# Patient Record
Sex: MALE | Race: WHITE | Employment: OTHER | URBAN - METROPOLITAN AREA
[De-identification: names, ages, dates, MRNs, and addresses within clinical notes are randomized per-mention and may not be internally consistent; named-entity substitution may affect disease eponyms.]

---

## 2018-12-12 ENCOUNTER — HOSPITAL ENCOUNTER (OUTPATIENT)
Dept: MRI IMAGING | Age: 43
Discharge: HOME | End: 2018-12-12
Payer: COMMERCIAL

## 2018-12-12 DIAGNOSIS — G44.89 ALLERGIC HEADACHE: ICD-10-CM

## 2018-12-12 DIAGNOSIS — I67.1 CEREBRAL ANEURYSM, NONRUPTURED: ICD-10-CM

## 2018-12-12 LAB
GFR AFRICAN AMERICAN: >60 ML/MIN/1.73M2
GFR NON-AFRICAN AMERICAN: >60 ML/MIN/1.73M2
POC CREATININE: 0.8 MG/DL (ref 0.9–1.3)

## 2018-12-21 ENCOUNTER — TELEPHONE (OUTPATIENT)
Dept: GASTROENTEROLOGY | Age: 43
End: 2018-12-21

## 2018-12-21 NOTE — TELEPHONE ENCOUNTER
[facility] pre cert dept called to get clinical information for the MRI pre-cert. They said that the MRI was ordered by Awa Hall I advised them that it incorrect. Romy Granger has never seen this patient. She expressed understanding.

## 2019-03-06 ENCOUNTER — HOSPITAL ENCOUNTER (OUTPATIENT)
Dept: MRI IMAGING | Age: 44
Discharge: HOME | End: 2019-03-06
Payer: COMMERCIAL

## 2019-03-06 PROCEDURE — 70544 MR ANGIOGRAPHY HEAD W/O DYE: CPT

## 2019-09-10 ENCOUNTER — HOSPITAL ENCOUNTER (OUTPATIENT)
Age: 44
Discharge: HOME | End: 2019-09-10
Payer: COMMERCIAL

## 2019-09-10 ENCOUNTER — HOSPITAL ENCOUNTER (OUTPATIENT)
Dept: GENERAL RADIOLOGY | Age: 44
Discharge: HOME | End: 2019-09-10
Payer: COMMERCIAL

## 2019-09-10 DIAGNOSIS — M54.2 NECK PAIN: ICD-10-CM

## 2019-09-10 DIAGNOSIS — M54.9 UPPER BACK PAIN: ICD-10-CM

## 2019-09-10 PROCEDURE — 72050 X-RAY EXAM NECK SPINE 4/5VWS: CPT

## 2020-05-11 ENCOUNTER — HOSPITAL ENCOUNTER (OUTPATIENT)
Age: 45
Discharge: HOME | End: 2020-05-11
Payer: COMMERCIAL

## 2020-05-11 LAB — HEMOGLOBIN: 15.2 GM/DL (ref 13.5–18)

## 2020-05-11 PROCEDURE — 85018 HEMOGLOBIN: CPT

## 2020-05-11 PROCEDURE — 84403 ASSAY OF TOTAL TESTOSTERONE: CPT

## 2020-05-11 PROCEDURE — 36415 COLL VENOUS BLD VENIPUNCTURE: CPT

## 2020-05-14 LAB — TESTOSTERONE TOTAL-MALE: 138 NG/DL (ref 300–890)

## 2020-07-30 ENCOUNTER — HOSPITAL ENCOUNTER (OUTPATIENT)
Age: 45
Discharge: HOME | End: 2020-07-30
Payer: COMMERCIAL

## 2020-07-30 LAB — HEMOGLOBIN: 16.1 GM/DL (ref 13.5–18)

## 2020-07-30 PROCEDURE — 85018 HEMOGLOBIN: CPT

## 2020-07-30 PROCEDURE — 84403 ASSAY OF TOTAL TESTOSTERONE: CPT

## 2020-07-30 PROCEDURE — 36415 COLL VENOUS BLD VENIPUNCTURE: CPT

## 2020-08-01 LAB — TESTOSTERONE TOTAL-MALE: 235 NG/DL (ref 300–890)

## 2020-11-24 ENCOUNTER — HOSPITAL ENCOUNTER (OUTPATIENT)
Age: 45
Discharge: HOME | End: 2020-11-24
Payer: COMMERCIAL

## 2020-11-24 LAB
CHOLESTEROL, FASTING: 281 MG/DL
ESTIMATED AVERAGE GLUCOSE: 197 MG/DL
HBA1C MFR BLD: 8.5 % (ref 4.2–6.3)
HDLC SERPL-MCNC: 40 MG/DL
LDL CHOLESTEROL DIRECT: 209 MG/DL
TRIGLYCERIDE, FASTING: 336 MG/DL

## 2020-11-24 PROCEDURE — 80061 LIPID PANEL: CPT

## 2020-11-24 PROCEDURE — 36415 COLL VENOUS BLD VENIPUNCTURE: CPT

## 2020-11-24 PROCEDURE — 83036 HEMOGLOBIN GLYCOSYLATED A1C: CPT

## 2020-11-24 PROCEDURE — 84403 ASSAY OF TOTAL TESTOSTERONE: CPT

## 2020-11-27 LAB — TESTOSTERONE TOTAL-MALE: 311 NG/DL (ref 300–890)

## 2021-05-10 ENCOUNTER — HOSPITAL ENCOUNTER (OUTPATIENT)
Age: 46
Discharge: HOME | End: 2021-05-10
Payer: COMMERCIAL

## 2021-05-10 LAB
CHOLESTEROL: 247 MG/DL
ESTIMATED AVERAGE GLUCOSE: 197 MG/DL
HBA1C MFR BLD: 8.5 % (ref 4.2–6.3)
HDLC SERPL-MCNC: 39 MG/DL
LDL CHOLESTEROL DIRECT: 183 MG/DL
TRIGL SERPL-MCNC: 209 MG/DL

## 2021-05-10 PROCEDURE — 84403 ASSAY OF TOTAL TESTOSTERONE: CPT

## 2021-05-10 PROCEDURE — 36415 COLL VENOUS BLD VENIPUNCTURE: CPT

## 2021-05-10 PROCEDURE — 80061 LIPID PANEL: CPT

## 2021-05-10 PROCEDURE — 83036 HEMOGLOBIN GLYCOSYLATED A1C: CPT

## 2021-05-10 PROCEDURE — 83721 ASSAY OF BLOOD LIPOPROTEIN: CPT

## 2021-05-11 LAB — TESTOSTERONE TOTAL-MALE: 153 NG/DL (ref 300–890)

## 2021-11-15 ENCOUNTER — HOSPITAL ENCOUNTER (OUTPATIENT)
Age: 46
Discharge: HOME | End: 2021-11-15
Payer: COMMERCIAL

## 2021-11-15 LAB
ALBUMIN SERPL-MCNC: 4.8 GM/DL (ref 3.4–5)
ALP BLD-CCNC: 75 IU/L (ref 40–128)
ALT SERPL-CCNC: 31 U/L (ref 10–40)
ANION GAP SERPL CALCULATED.3IONS-SCNC: 12 MMOL/L (ref 4–16)
AST SERPL-CCNC: 20 IU/L (ref 15–37)
BASOPHILS ABSOLUTE: 0 K/CU MM
BASOPHILS RELATIVE PERCENT: 0.3 % (ref 0–1)
BILIRUB SERPL-MCNC: 0.3 MG/DL (ref 0–1)
BUN BLDV-MCNC: 13 MG/DL (ref 6–23)
CALCIUM SERPL-MCNC: 9.2 MG/DL (ref 8.3–10.6)
CHLORIDE BLD-SCNC: 100 MMOL/L (ref 99–110)
CO2: 24 MMOL/L (ref 21–32)
CREAT SERPL-MCNC: 0.6 MG/DL (ref 0.9–1.3)
DIFFERENTIAL TYPE: ABNORMAL
EOSINOPHILS ABSOLUTE: 0.1 K/CU MM
EOSINOPHILS RELATIVE PERCENT: 1 % (ref 0–3)
ESTIMATED AVERAGE GLUCOSE: 160 MG/DL
GFR AFRICAN AMERICAN: >60 ML/MIN/1.73M2
GFR NON-AFRICAN AMERICAN: >60 ML/MIN/1.73M2
GLUCOSE BLD-MCNC: 150 MG/DL (ref 70–99)
HBA1C MFR BLD: 7.2 % (ref 4.2–6.3)
HCT VFR BLD CALC: 45.8 % (ref 42–52)
HEMOGLOBIN: 15.1 GM/DL (ref 13.5–18)
IMMATURE NEUTROPHIL %: 0.6 % (ref 0–0.43)
LYMPHOCYTES ABSOLUTE: 2 K/CU MM
LYMPHOCYTES RELATIVE PERCENT: 29.2 % (ref 24–44)
MCH RBC QN AUTO: 31 PG (ref 27–31)
MCHC RBC AUTO-ENTMCNC: 33 % (ref 32–36)
MCV RBC AUTO: 94 FL (ref 78–100)
MONOCYTES ABSOLUTE: 0.7 K/CU MM
MONOCYTES RELATIVE PERCENT: 10 % (ref 0–4)
NUCLEATED RBC %: 0 %
PDW BLD-RTO: 13.2 % (ref 11.7–14.9)
PLATELET # BLD: 268 K/CU MM (ref 140–440)
PMV BLD AUTO: 9.7 FL (ref 7.5–11.1)
POTASSIUM SERPL-SCNC: 4.4 MMOL/L (ref 3.5–5.1)
RBC # BLD: 4.87 M/CU MM (ref 4.6–6.2)
SEGMENTED NEUTROPHILS ABSOLUTE COUNT: 4 K/CU MM
SEGMENTED NEUTROPHILS RELATIVE PERCENT: 58.9 % (ref 36–66)
SODIUM BLD-SCNC: 136 MMOL/L (ref 135–145)
TOTAL IMMATURE NEUTOROPHIL: 0.04 K/CU MM
TOTAL NUCLEATED RBC: 0 K/CU MM
TOTAL PROTEIN: 7.9 GM/DL (ref 6.4–8.2)
WBC # BLD: 6.8 K/CU MM (ref 4–10.5)

## 2021-11-15 PROCEDURE — 84403 ASSAY OF TOTAL TESTOSTERONE: CPT

## 2021-11-15 PROCEDURE — 80053 COMPREHEN METABOLIC PANEL: CPT

## 2021-11-15 PROCEDURE — 85025 COMPLETE CBC W/AUTO DIFF WBC: CPT

## 2021-11-15 PROCEDURE — 83036 HEMOGLOBIN GLYCOSYLATED A1C: CPT

## 2021-11-15 PROCEDURE — 36415 COLL VENOUS BLD VENIPUNCTURE: CPT

## 2021-11-17 LAB — TESTOSTERONE TOTAL-MALE: 363 NG/DL (ref 300–890)

## 2022-03-18 ENCOUNTER — HOSPITAL ENCOUNTER (EMERGENCY)
Age: 47
Discharge: HOME | End: 2022-03-18
Attending: EMERGENCY MEDICINE
Payer: COMMERCIAL

## 2022-03-18 VITALS
HEIGHT: 73 IN | OXYGEN SATURATION: 96 % | HEART RATE: 104 BPM | BODY MASS INDEX: 37.37 KG/M2 | RESPIRATION RATE: 17 BRPM | SYSTOLIC BLOOD PRESSURE: 128 MMHG | WEIGHT: 282 LBS | TEMPERATURE: 97.7 F | DIASTOLIC BLOOD PRESSURE: 90 MMHG

## 2022-03-18 DIAGNOSIS — S86.911A STRAIN OF RIGHT KNEE, INITIAL ENCOUNTER: Primary | ICD-10-CM

## 2022-03-18 PROCEDURE — 99283 EMERGENCY DEPT VISIT LOW MDM: CPT

## 2022-03-18 RX ORDER — TESTOSTERONE 20.25 MG/1.25G
GEL TOPICAL
COMMUNITY

## 2022-03-18 NOTE — ED NOTES
Discharge instructions given to the patient who expressed understanding of information and follow up care.       Nettie Stevens RN  03/18/22 0178

## 2022-03-18 NOTE — ED PROVIDER NOTES
EMERGENCY DEPARTMENT ENCOUNTER      PCP: Harry Martínez MD    [facility]    Chief Complaint   Patient presents with    Knee Pain       HPI    Rosaura Allison is a 51 y.o. male who presents with Right knee pain. Onset was last PM. The context is pt slipped and fell in the garage tisting the knee. Pain is localized to to the posterior right side of the knee. The patient has no associated other injuries. The pain is aggravated by ambulation and knee movement, in particular any twisting movement. No swelling. No numbness, tingling or weakness. REVIEW OF SYSTEMS    General: Denies fever or chills  Cardiac: Denies chest pain  Pulmonary: Denies shortness of breath  GI: Denies abdominal pain, vomiting, or diarrhea  : No dysuria or hematuria    Denies any other muscles skeletal injuries, including chest wall and back. All other review of systems are negative  See HPI and nursing notes for additional information     PAST MEDICAL & SURGICAL HISTORY    Past Medical History:   Diagnosis Date    Ankylosing spondylitis ([facility] [address]     Gout     High blood pressure     High cholesterol     Migraine     Thyroid disease     Ulcer      No past surgical history on file. CURRENT MEDICATIONS    Current Outpatient Rx   Medication Sig Dispense Refill    SITagliptin-metFORMIN HCl (JANUMET PO) Take by mouth      Testosterone 1.62 % GEL Place onto the skin      ibuprofen (ADVIL;MOTRIN) 800 MG tablet Take 800 mg by mouth every 6 hours as needed.  naproxen (NAPROSYN) 500 MG tablet Take 1 tablet by mouth 2 times daily (with meals). 60 tablet 1    naproxen (NAPROSYN) 500 MG tablet Take 1 tablet by mouth 2 times daily (with meals).  60 tablet 3       ALLERGIES    No Known Allergies    SOCIAL & FAMILY HISTORY    Social History     Socioeconomic History    Marital status:      Spouse name: Not on file    Number of children: Not on file    Years of education: Not on file    Highest education level: Not on file   Occupational History    Not on file   Tobacco Use    Smoking status: Never Smoker    Smokeless tobacco: Never Used   Vaping Use    Vaping Use: Never used   Substance and Sexual Activity    Alcohol use: No    Drug use: No    Sexual activity: Never   Other Topics Concern    Not on file   Social History Narrative    Not on file     Social Determinants of Health     Financial Resource Strain:     Difficulty of Paying Living Expenses: Not on file   Food Insecurity:     Worried About Running Out of Food in the Last Year: Not on file    Halley of Food in the Last Year: Not on file   Transportation Needs:     Lack of Transportation (Medical): Not on file    Lack of Transportation (Non-Medical):  Not on file   Physical Activity:     Days of Exercise per Week: Not on file    Minutes of Exercise per Session: Not on file   Stress:     Feeling of Stress : Not on file   Social Connections:     Frequency of Communication with Friends and Family: Not on file    Frequency of Social Gatherings with Friends and Family: Not on file    Attends Catholic Services: Not on file    Active Member of [address] [place] [address] or Organizations: Not on file    Attends Club or Organization Meetings: Not on file    Marital Status: Not on file   Intimate Partner Violence:     Fear of Current or Ex-Partner: Not on file    Emotionally Abused: Not on file    Physically Abused: Not on file    Sexually Abused: Not on file   Housing Stability:     Unable to Pay for Housing in the Last Year: Not on file    Number of [place] in the Last Year: Not on file    Unstable Housing in the Last Year: Not on file     Family History   Problem Relation Age of Onset    Diabetes Father            PHYSICAL EXAM    VITAL SIGNS: BP (!) 128/90   Pulse 104   Temp 97.7 °F (36.5 °C) (Oral)   Resp 17   Ht 6' 1\" (1.854 m)   Wt 282 lb (127.9 kg)   SpO2 96%   BMI 37.21 kg/m²   Constitutional:  Well developed, Appears comfortable    Musculoskeletal: Right knee exam:   Abrasion to anterior knee   -Inspection:  No obvious defects or deformities, skin intact except as noted above   - Palpation: No redness, or warmth      No effusion     No joint line, parapatellar tenderness. Tender to palpation posteriorly on the lateral side. -ROM:  Extension - Has full extension (Extensor mechanism intact)    Flexion - Mildly limited due pain   -Provocative tests:  Negative Anterior Drawer, Mcmurrays, Ignacio. No varus / valgus laxity. Manual manipulation of knee slightly limited due to pain. No swelling, discoloration, tenderness to palpation of lower leg, or range of motion deficit of the ipsilateral hip and ankle  Vascular: Distal pulses (DP, PT) intact on affected side. Capillary refill intact. Integument:  Well hydrated, no rash   Neurologic:  Awake and alert, normal flow of speech. Distal sensation, motor intact. Psychiatric: Cooperative, pleasant affect        RADIOLOGY/PROCEDURES      ED COURSE & MEDICAL DECISION MAKING       Vital signs and nursing notes reviewed during ED course. I have independently evaluated this patient . All pertinent Lab data and radiographic results reviewed with patient at bedside. The patient and/or the family were informed of the results of any tests/labs/imaging, the treatment plan, and time was allotted to answer questions. Differential diagnosis: includes but not limited to ligamentous injury, tendon injury, soft tissue contusion/hematoma, fracture, dislocation, Infection, Neurologic Deficit/Injury, Meniscus tear, ACL tear, tibial plateau fracture, extensor mechanism rupture, dislocation, Arterial Injury/Ischemia. Clinical  IMPRESSION    1. Strain of right knee, initial encounter        History and exam is consistent with sprain of knee. I discussed the possibility of internal derangement.   Recommended rest ice and elevation and explained that he would need an MRI to further evaluate the connective tissue of his knee and this can be done with an orthopedic physician. Recommended ibuprofen as needed for pain and provided with an Ace wrap in the emergency department. Recommend follow-up with orthopedist if no improvement over the next 5-7 days. Diagnosis and plan discussed in detail with patient who understands and agrees. Patient agrees to return emergency department if symptoms worsen or any new symptoms develop. Plan of care explained to patient. Concerning signs and symptoms warranting a return visit to the Emergency Department were explained in detail. All questions and concerns were addressed to the patient's satisfaction. Patient understood and agreed with plan. Comment: Please note this report has been produced using speech recognition software and may contain errors related to that system including errors in grammar, punctuation, and spelling, as well as words and phrases that may be inappropriate. If there are any questions or concerns please feel free to contact the dictating provider for clarification.         Eve Yancey MD  03/18/22 9840

## 2022-03-18 NOTE — ED TRIAGE NOTES
Pt arrives with complaints of right knee pain that started when he slipped on the wet garage floor last night. He is complaining of pain to lateral aspect and just wants to have it checked before his vacation next month. Ambulatory.

## 2022-03-30 ENCOUNTER — OFFICE VISIT (OUTPATIENT)
Dept: ORTHOPEDIC SURGERY | Age: 47
End: 2022-03-30
Payer: COMMERCIAL

## 2022-03-30 VITALS
RESPIRATION RATE: 16 BRPM | HEIGHT: 73 IN | HEART RATE: 102 BPM | BODY MASS INDEX: 37.37 KG/M2 | OXYGEN SATURATION: 96 % | WEIGHT: 282 LBS

## 2022-03-30 DIAGNOSIS — S43.401A SPRAIN OF RIGHT SHOULDER, UNSPECIFIED SHOULDER SPRAIN TYPE, INITIAL ENCOUNTER: Primary | ICD-10-CM

## 2022-03-30 DIAGNOSIS — S83.91XA SPRAIN OF RIGHT KNEE, UNSPECIFIED LIGAMENT, INITIAL ENCOUNTER: ICD-10-CM

## 2022-03-30 PROCEDURE — 20610 DRAIN/INJ JOINT/BURSA W/O US: CPT | Performed by: PHYSICIAN ASSISTANT

## 2022-03-30 PROCEDURE — G8417 CALC BMI ABV UP PARAM F/U: HCPCS | Performed by: PHYSICIAN ASSISTANT

## 2022-03-30 PROCEDURE — 99203 OFFICE O/P NEW LOW 30 MIN: CPT | Performed by: PHYSICIAN ASSISTANT

## 2022-03-30 PROCEDURE — G8427 DOCREV CUR MEDS BY ELIG CLIN: HCPCS | Performed by: PHYSICIAN ASSISTANT

## 2022-03-30 PROCEDURE — 1036F TOBACCO NON-USER: CPT | Performed by: PHYSICIAN ASSISTANT

## 2022-03-30 PROCEDURE — G8484 FLU IMMUNIZE NO ADMIN: HCPCS | Performed by: PHYSICIAN ASSISTANT

## 2022-03-30 ASSESSMENT — ENCOUNTER SYMPTOMS
EYES NEGATIVE: 1
RESPIRATORY NEGATIVE: 1
GASTROINTESTINAL NEGATIVE: 1

## 2022-03-30 NOTE — PATIENT INSTRUCTIONS
Continue to weight bear as tolerated  Continue range of motion   Ice and elevate as needed  Tylenol or motrin for pain   Injection given today in office   Rest for 24-48 hours  Follow up if you want to proceed with MRI

## 2022-03-30 NOTE — PROGRESS NOTES
Review of Systems   Constitutional: Negative. HENT: Negative. Eyes: Negative. Respiratory: Negative. Cardiovascular: Negative. Gastrointestinal: Negative. Genitourinary: Negative. Musculoskeletal: Positive for arthralgias. Skin: Negative. Negative for rash and wound. Neurological: Negative. Psychiatric/Behavioral: Negative. Stewart Ortiz is a 51 y.o. male that is in the office today with right knee pain. Patient states that he injured themselves by slipping on a wet garage floor. Patient states that the injury happened 3/17/22. Pt states that he is having pain on the anterior aspect of his knee and down his calf. Pt states that he felt an immediate burning sensation on the ACL with accompanied swelling. Pt states he also tried to catch himself with his right hand causing pain to the accompanying shoulder. He states that he has felt sharp pain centralized around the rotator cuff. Pt denies any previous injuries or surgeries. Past Medical History:   Diagnosis Date    Ankylosing spondylitis ([address]     Gout     High blood pressure     High cholesterol     Migraine     Thyroid disease     Ulcer        No past surgical history on file.     Family History   Problem Relation Age of Onset    Diabetes Father        Social History     Socioeconomic History    Marital status:      Spouse name: None    Number of children: None    Years of education: None    Highest education level: None   Occupational History    None   Tobacco Use    Smoking status: Never Smoker    Smokeless tobacco: Never Used   Vaping Use    Vaping Use: Never used   Substance and Sexual Activity    Alcohol use: No    Drug use: No    Sexual activity: Never   Other Topics Concern    None   Social History Narrative    None     Social Determinants of Health     Financial Resource Strain:     Difficulty of Paying Living Expenses: Not on file   Food Insecurity:     Worried About Running Out of Food in the Last Year: Not on file    Ran Out of Food in the Last Year: Not on file   Transportation Needs:     Lack of Transportation (Medical): Not on file    Lack of Transportation (Non-Medical): Not on file   Physical Activity:     Days of Exercise per Week: Not on file    Minutes of Exercise per Session: Not on file   Stress:     Feeling of Stress : Not on file   Social Connections:     Frequency of Communication with Friends and Family: Not on file    Frequency of Social Gatherings with Friends and Family: Not on file    Attends Spiritism Services: Not on file    Active Member of [address] [place] [address] Topic or Organizations: Not on file    Attends Club or Organization Meetings: Not on file    Marital Status: Not on file   Intimate Partner Violence:     Fear of Current or Ex-Partner: Not on file    Emotionally Abused: Not on file    Physically Abused: Not on file    Sexually Abused: Not on file   Housing Stability:     Unable to Pay for Housing in the Last Year: Not on file    Number of [place] in the Last Year: Not on file    Unstable Housing in the Last Year: Not on file       Current Outpatient Medications   Medication Sig Dispense Refill    SITagliptin-metFORMIN HCl (JANUMET PO) Take by mouth      Testosterone 1.62 % GEL Place onto the skin      ibuprofen (ADVIL;MOTRIN) 800 MG tablet Take 800 mg by mouth every 6 hours as needed.  naproxen (NAPROSYN) 500 MG tablet Take 1 tablet by mouth 2 times daily (with meals). 60 tablet 1    naproxen (NAPROSYN) 500 MG tablet Take 1 tablet by mouth 2 times daily (with meals). 60 tablet 3     No current facility-administered medications for this visit. No Known Allergies    Review of Systems:  See above      Physical Exam:   Pulse 102   Resp 16   Ht 6' 1\" (1.854 m)   Wt 282 lb (127.9 kg)   SpO2 96%   BMI 37.21 kg/m²        Gait is Antalgic. Gen/Psych:Examination reveals a pleasant individual in no acute distress.  The patient is oriented to time, place and person. The patient's mood and affect are appropriate. Patient appears well nourished. Body habitus is overweight     Lymph:  no lymphedema in bilateral lower extremities     Skin intact in bilateral lower extremities with no ulcerations, lesions, rash, erythema. Vascular: There are no varicosities in bilateral lower extremities, sensation intact to light touch over bilateral lower extremities. Right shoulder exam:  Skin:  Clear with no erythema, there is no significant joint effusion  Deformity:  none  Atrophy:  none  Tenderness:  none  Active ROM:   FE:180    IR side: L5            Strength: FE:5/5     ER:5/5 IR:5/5      Elbow flexion: 5/5  Neer:  mildly positive  Goodrich:  moderately positive    right leg/knee exam:  Leg alignment:     neutral  Quadriceps/hamstring atrophy:   no  Knee effusion:    No significant effusion present  Knee erythema:   no  ROM:     full  Lachman:    no  Posterior Drawer:   no  Varus laxity at 0 and 30 deg's: no  Valguslaxity at 0 and 30 deg's: no  Tenderness at:   Posterior lateral knee          Imaging studies:  4 views of the right knee taken and reviewed in the office today show minimal degenerative changes, no acute fractures and no dislocations in the right knee. 3 views of the right shoulder taken reviewed in the office today show irregularity of the greater tuberosity likely consistent with chronic rotator cuff inflammation. No acute fractures, no dislocations, no significant degenerative changes. The official read and interpretation of these x-rays will be done by the the [facility]       Impression:     Diagnosis Orders   1.  Sprain of right shoulder, unspecified shoulder sprain type, initial encounter  XR SHOULDER RIGHT (MIN 2 VIEWS)   2. Sprain of right knee, unspecified ligament, initial encounter             Plan:    Patient Instructions   Continue to weight bear as tolerated  Continue range of motion   Ice and elevate as needed  Tylenol or motrin for pain   Injection given today in office   Rest for 24-48 hours  Follow up if you want to proceed with MRI          right Subacromial Bursa Aspiration / Injection Procedure:  Multiple treatment options were discussed. This injection was recommended as a part of the overall treatment plan. Details of the procedure, potential risks, and potential benefits were discussed. Patient's questions were answered. Patient elected to proceed with procedure. Medication: 1 mL Kenalog 40 mg/ML, 1 mL 0.5% bupivacaine, 1 mL 1% lidocaine  Procedure:  Sterile technique was used as the skin over the injection site was prepped with alcohol. The right subacromial bursa was then injected with the above listed medication. A sterile bandage was placed over the injection site. The patient tolerated the procedure well without complication.

## 2022-10-27 ENCOUNTER — HOSPITAL ENCOUNTER (OUTPATIENT)
Age: 47
Discharge: HOME | End: 2022-10-27
Payer: COMMERCIAL

## 2022-10-27 LAB
ALBUMIN SERPL-MCNC: 4.7 GM/DL (ref 3.4–5)
ALP BLD-CCNC: 80 IU/L (ref 40–128)
ALT SERPL-CCNC: 33 U/L (ref 10–40)
ANION GAP SERPL CALCULATED.3IONS-SCNC: 12 MMOL/L (ref 4–16)
AST SERPL-CCNC: 17 IU/L (ref 15–37)
BILIRUB SERPL-MCNC: 0.3 MG/DL (ref 0–1)
BUN BLDV-MCNC: 16 MG/DL (ref 6–23)
CALCIUM SERPL-MCNC: 9.5 MG/DL (ref 8.3–10.6)
CHLORIDE BLD-SCNC: 98 MMOL/L (ref 99–110)
CHOLESTEROL: 256 MG/DL
CO2: 23 MMOL/L (ref 21–32)
CREAT SERPL-MCNC: 0.6 MG/DL (ref 0.9–1.3)
ESTIMATED AVERAGE GLUCOSE: 174 MG/DL
GFR SERPL CREATININE-BSD FRML MDRD: >60 ML/MIN/1.73M2
GLUCOSE BLD-MCNC: 201 MG/DL (ref 70–99)
HBA1C MFR BLD: 7.7 % (ref 4.2–6.3)
HCT VFR BLD CALC: 46.4 % (ref 42–52)
HDLC SERPL-MCNC: 35 MG/DL
HEMOGLOBIN: 15.7 GM/DL (ref 13.5–18)
LDL CHOLESTEROL CALCULATED: 142 MG/DL
MCH RBC QN AUTO: 31.1 PG (ref 27–31)
MCHC RBC AUTO-ENTMCNC: 33.8 % (ref 32–36)
MCV RBC AUTO: 91.9 FL (ref 78–100)
PDW BLD-RTO: 11.9 % (ref 11.7–14.9)
PLATELET # BLD: 267 K/CU MM (ref 140–440)
PMV BLD AUTO: 9.9 FL (ref 7.5–11.1)
POTASSIUM SERPL-SCNC: 4.6 MMOL/L (ref 3.5–5.1)
RBC # BLD: 5.05 M/CU MM (ref 4.6–6.2)
SODIUM BLD-SCNC: 133 MMOL/L (ref 135–145)
TOTAL PROTEIN: 7.8 GM/DL (ref 6.4–8.2)
TRIGL SERPL-MCNC: 396 MG/DL
WBC # BLD: 7.6 K/CU MM (ref 4–10.5)

## 2022-10-27 PROCEDURE — 85027 COMPLETE CBC AUTOMATED: CPT

## 2022-10-27 PROCEDURE — 83036 HEMOGLOBIN GLYCOSYLATED A1C: CPT

## 2022-10-27 PROCEDURE — 80053 COMPREHEN METABOLIC PANEL: CPT

## 2022-10-27 PROCEDURE — 80061 LIPID PANEL: CPT

## 2022-10-27 PROCEDURE — 84403 ASSAY OF TOTAL TESTOSTERONE: CPT

## 2022-10-29 LAB — TESTOSTERONE TOTAL-MALE: 242 NG/DL (ref 300–890)

## 2022-11-28 ENCOUNTER — OFFICE VISIT (OUTPATIENT)
Dept: NEUROLOGY | Age: 47
End: 2022-11-28
Payer: COMMERCIAL

## 2022-11-28 DIAGNOSIS — M54.12 CERVICAL RADICULOPATHY: Primary | ICD-10-CM

## 2022-11-28 DIAGNOSIS — G56.03 CARPAL TUNNEL SYNDROME, BILATERAL UPPER LIMBS: ICD-10-CM

## 2022-11-28 PROCEDURE — 95911 NRV CNDJ TEST 9-10 STUDIES: CPT | Performed by: PHYSICAL MEDICINE & REHABILITATION

## 2022-11-28 PROCEDURE — 95886 MUSC TEST DONE W/N TEST COMP: CPT | Performed by: PHYSICAL MEDICINE & REHABILITATION

## 2022-11-28 NOTE — PROGRESS NOTES
EMG    Risks and benefits of study discussed. Specific and common risks of pain and bleeding as well as uncommon side effects of infection, hematoma and vasovagal episodes    Patient agreeable to testing and consents to such. Clinical: Several years of worsening right neck and shoulder pain, midline neck pain with painful crepitance, radiation down the right arm, weakness and paresthesias. Several decades ago was diagnosed with ankylosing spondylitis but deferred treatment secondary to side effects of prednisone and overall lack of efficacy. Motor NCS:  Median amplitudes normal bilateral; latencies borderline to mildly prolonged, velocities normal  Ulnar amplitudes and latencies normal bilateral; velocity normal on the right mildly slow on the left through both the elbow and forearm segments    Sensory NCS:  Median amplitudes low normal bilateral; latencies mild to moderately prolonged  Ulnar amplitudes low normal bilateral, latency on the right normal left is mild to moderately prolonged  Right radial amplitude and latency normal    Needle EMG: Normal findings throughout the right upper limb    Impression:  #1 Regarding right cervical radiculopathy symptoms:  normal study without axon loss associated with cervical radiculopathy.  - Normal EMG can not rule out painful/sensory radiculopathy, without motor axon loss involvement. Further clinical/radiographic correlation recommended. #2 mild to moderate median neuropathies at the wrist bilaterally, without a great deal of carpal tunnel syndrome symptoms, but occasionally reported. #3 mild ulnar neuropathy in the left upper limb involving at least the wrist.  Again, not very symptomatic at the current time.

## 2022-12-14 ENCOUNTER — OFFICE VISIT (OUTPATIENT)
Dept: ORTHOPEDIC SURGERY | Age: 47
End: 2022-12-14
Payer: COMMERCIAL

## 2022-12-14 ENCOUNTER — TELEPHONE (OUTPATIENT)
Dept: NEUROSURGERY | Age: 47
End: 2022-12-14

## 2022-12-14 VITALS
RESPIRATION RATE: 16 BRPM | SYSTOLIC BLOOD PRESSURE: 114 MMHG | HEIGHT: 73 IN | WEIGHT: 280 LBS | BODY MASS INDEX: 37.11 KG/M2 | HEART RATE: 78 BPM | OXYGEN SATURATION: 99 % | DIASTOLIC BLOOD PRESSURE: 70 MMHG

## 2022-12-14 DIAGNOSIS — M24.80 CERVICAL SPINE CREPITUS: Primary | ICD-10-CM

## 2022-12-14 DIAGNOSIS — S83.91XA SPRAIN OF RIGHT KNEE, UNSPECIFIED LIGAMENT, INITIAL ENCOUNTER: Primary | ICD-10-CM

## 2022-12-14 DIAGNOSIS — M24.80 CERVICAL SPINE CREPITUS: ICD-10-CM

## 2022-12-14 PROCEDURE — G8427 DOCREV CUR MEDS BY ELIG CLIN: HCPCS | Performed by: PHYSICIAN ASSISTANT

## 2022-12-14 PROCEDURE — G8417 CALC BMI ABV UP PARAM F/U: HCPCS | Performed by: PHYSICIAN ASSISTANT

## 2022-12-14 PROCEDURE — 1036F TOBACCO NON-USER: CPT | Performed by: PHYSICIAN ASSISTANT

## 2022-12-14 PROCEDURE — G8484 FLU IMMUNIZE NO ADMIN: HCPCS | Performed by: PHYSICIAN ASSISTANT

## 2022-12-14 PROCEDURE — 99213 OFFICE O/P EST LOW 20 MIN: CPT | Performed by: PHYSICIAN ASSISTANT

## 2022-12-14 RX ORDER — ALBUTEROL SULFATE 90 UG/1
AEROSOL, METERED RESPIRATORY (INHALATION)
COMMUNITY

## 2022-12-14 RX ORDER — MELOXICAM 15 MG/1
TABLET ORAL
COMMUNITY
Start: 2022-09-13

## 2022-12-14 ASSESSMENT — ENCOUNTER SYMPTOMS
RESPIRATORY NEGATIVE: 1
GASTROINTESTINAL NEGATIVE: 1
EYES NEGATIVE: 1

## 2022-12-14 NOTE — TELEPHONE ENCOUNTER
Received a referral from Gianni Lewis PA-C for neck pain (cervical spine crepitus). Patient has not had any recent imaging obtained. Please advise on any imaging that is needed prior to appointment - thank you.

## 2022-12-14 NOTE — PATIENT INSTRUCTIONS
Central Scheduling # [phone]    Right knee MRI ordered  Osiel Villarreal Referral Ordered  Continue to weight bear as tolerated  Continue range of motion  Ice and elevate as needed  Tylenol or Motrin for pain  Follow up after MRI      We are committed to providing you the best care possible. If you receive a survey after visiting one of our offices, please take time to share your experience concerning your physician office visit. These surveys are confidential and no health information about you is shared. We are eager to improve for you and we are counting on your feedback to help make that happen.

## 2022-12-14 NOTE — PROGRESS NOTES
Review of Systems   Constitutional: Negative. HENT: Negative. Eyes: Negative. Respiratory: Negative. Cardiovascular: Negative. Gastrointestinal: Negative. Genitourinary: Negative. Musculoskeletal:  Positive for arthralgias and myalgias. Skin: Negative. Negative for rash and wound. Neurological: Negative. Psychiatric/Behavioral: Negative. HPI:  Mitch Mondragon is a 49 y.o. male that presents to the office today complaining of persistent right knee pain along the posterior lateral aspect of the right knee and radiating to the right calf. This is a result of an injury that he sustained in his garage back in March 2022. He reminded me today that his injury was the result of a slip on his garage floor when his left leg went straight out in front of him and his right leg went straight behind him having him ending up in a split type position. He states that after this happened he panicked and he tried to swing his right leg around and that is when he felt a pop in the right knee. He states that he tried to wait it out to see if it would get better and it never really got better. He is also having pain in his neck and his right shoulder. He has had neck issues for a long time and was told he had ankylosing spondylitis. He would like to see somebody for his neck if possible. His right knee/right leg does cause him difficulty especially when ambulating. He rates his pain today at a 5/10. Past Medical History:   Diagnosis Date    Ankylosing spondylitis ([facility] [address]     Gout     High blood pressure     High cholesterol     Migraine     Thyroid disease     Ulcer        No past surgical history on file.     Family History   Problem Relation Age of Onset    Diabetes Father        Social History     Socioeconomic History    Marital status:      Spouse name: None    Number of children: None    Years of education: None    Highest education level: None   Tobacco Use    Smoking status: Never    Smokeless tobacco: Never   Vaping Use    Vaping Use: Never used   Substance and Sexual Activity    Alcohol use: No    Drug use: No    Sexual activity: Never       Current Outpatient Medications   Medication Sig Dispense Refill    meloxicam (MOBIC) 15 MG tablet prn      albuterol sulfate HFA (PROVENTIL;VENTOLIN;PROAIR) 108 (90 Base) MCG/ACT inhaler Ventolin HFA 90 mcg/actuation aerosol inhaler      SITagliptin-metFORMIN HCl (JANUMET PO) Take by mouth      Testosterone 1.62 % GEL Place onto the skin      ibuprofen (ADVIL;MOTRIN) 800 MG tablet Take 800 mg by mouth every 6 hours as needed. naproxen (NAPROSYN) 500 MG tablet Take 1 tablet by mouth 2 times daily (with meals). 60 tablet 1    naproxen (NAPROSYN) 500 MG tablet Take 1 tablet by mouth 2 times daily (with meals). 60 tablet 3     No current facility-administered medications for this visit. No Known Allergies    Review of Systems:  See above      Physical Exam:   /70 (Site: Right Upper Arm, Position: Sitting, Cuff Size: Medium Adult)   Pulse 78   Resp 16   Ht 6' 1\" (1.854 m)   Wt 280 lb (127 kg)   SpO2 99%   BMI 36.94 kg/m²        Gait is Antalgic. The patient can bear weight on the injured extremity. Gen/Psych: Examinationreveals a pleasant individual in no acute distress. The patient is oriented to time, place and person. The patient's mood and affect are appropriate. Lymph: The lymphatic examination bilaterally reveals allareas to be without enlargement or induration. Skin intact without lymphadenopathy, discoloration, or abnormal temperature. Vascular: There is intact, symmetric circulation in both lower extremities.     right leg/knee exam:  Leg alignment:     neutral  Quadriceps/hamstring atrophy:   no  Knee effusion:    no   Knee erythema:   no  ROM:     0-115°  Lachman:    no  Pivot Shift:    no  Posterior drawer:   no    Varus laxity at 0 and 30 deg's: no  Valguslaxity at 0 and 30 deg's: no  Recurvatum:    no  Tenderness at:   Posterior lateral corner, pain with Susana      Knee strength is 5/5 flexion and extension  There is + L2-S1 motor and sensory function. Outside record review: Review of prior office notes and x-rays. Impression:    Diagnosis Orders   1. Sprain of right knee, unspecified ligament, initial encounter  MRI KNEE RIGHT WO CONTRAST      2. Cervical spine crepitus  Malissa - Alia Rodriguez PA-C, Neurosurgery, [facility]:    Because of the persistent right knee pain that has not gotten better over the last 9 months we will go ahead and get an MRI of the right knee to evaluate for possible lateral meniscus tear or other internal derangement. I did explain to him that some of this may actually coming from his calf as he points to part of his calf which is bothering him. As far as his cervical spine pain I will refer him on to [facility]. I did explain to him that shoulder pain can be the result of a cervical spine issue therefore we will have him get that evaluated and then move forward with evaluating the shoulder afterwards.

## 2022-12-19 NOTE — TELEPHONE ENCOUNTER
Spoke to patient. Appointment scheduled on Tuesday, 12/27/22 @ 240 pm with Phyllis Swanson PA-C. Location of appointment given to patient. Patient also aware that XR Cervical Spine AP/LAT/FLEX/EXT needs done prior to appointment (by 12/23/22). Patient agreeable and verbalized understanding. Nothing further needed at this time.

## 2022-12-27 NOTE — TELEPHONE ENCOUNTER
Patient did not obtain xrays needed prior to appointment today. Appointment has been rescheduled to 1/6/23 @1030 am. Nothing further needed.

## 2023-01-03 ENCOUNTER — HOSPITAL ENCOUNTER (OUTPATIENT)
Dept: GENERAL RADIOLOGY | Age: 48
Discharge: HOME | End: 2023-01-03
Payer: COMMERCIAL

## 2023-01-03 ENCOUNTER — HOSPITAL ENCOUNTER (OUTPATIENT)
Age: 48
Discharge: HOME | End: 2023-01-03
Payer: COMMERCIAL

## 2023-01-03 DIAGNOSIS — M24.80 CERVICAL SPINE CREPITUS: ICD-10-CM

## 2023-01-03 PROCEDURE — 72040 X-RAY EXAM NECK SPINE 2-3 VW: CPT

## 2023-01-05 NOTE — PATIENT INSTRUCTIONS
Notify the neurosurgical office urgently if you begin to develop any numbness or tingling in extremities, weakness in extremities, or loss of bowel or bladder control. Obtain cervical MRI and call our office once imaging has been completed. [facility] will be calling you to schedule your MRI. If you have not heard from anyone regarding testing within 2 business days please call Central Scheduling at [phone].      You can get your blood work done at the [employer] [facility] on [employer]

## 2023-01-05 NOTE — PROGRESS NOTES
Patient is a 49 y.o. y.o. male who presents to the office today as a new patient for an evaluation of neck pain. Patient referred by Wil Zuniga PA-C. Patient states she has had neck issues for a long time (20 + years) and was told he has ankylosing spondylosis. Patient sees Wil Zuniga PA-C for right knee pain. Pain scale 1/10 today. States he is taking ibuprofen  for the pain with moderate relief. States he does have weakness in his bilateral hands that's been going on for several years. States his hands will get shaky from time to time when trying to hold things. Patient has been ambulating without any assistive device. XR Cervical Spine AP/LAT/FLEX/EXT 1/3/23  Impression   Mild degenerative change.

## 2023-01-06 ENCOUNTER — OFFICE VISIT (OUTPATIENT)
Dept: NEUROSURGERY | Age: 48
End: 2023-01-06
Payer: COMMERCIAL

## 2023-01-06 ENCOUNTER — CLINICAL DOCUMENTATION (OUTPATIENT)
Dept: NEUROSURGERY | Age: 48
End: 2023-01-06

## 2023-01-06 VITALS
BODY MASS INDEX: 37.37 KG/M2 | DIASTOLIC BLOOD PRESSURE: 88 MMHG | HEIGHT: 73 IN | RESPIRATION RATE: 16 BRPM | SYSTOLIC BLOOD PRESSURE: 132 MMHG | OXYGEN SATURATION: 95 % | HEART RATE: 107 BPM | WEIGHT: 282 LBS

## 2023-01-06 DIAGNOSIS — G95.9 CERVICAL MYELOPATHY (HCC): Primary | ICD-10-CM

## 2023-01-06 DIAGNOSIS — Z87.39 HX OF ANKYLOSING SPONDYLITIS: ICD-10-CM

## 2023-01-06 PROCEDURE — G8427 DOCREV CUR MEDS BY ELIG CLIN: HCPCS | Performed by: PHYSICIAN ASSISTANT

## 2023-01-06 PROCEDURE — G8417 CALC BMI ABV UP PARAM F/U: HCPCS | Performed by: PHYSICIAN ASSISTANT

## 2023-01-06 PROCEDURE — 99203 OFFICE O/P NEW LOW 30 MIN: CPT | Performed by: PHYSICIAN ASSISTANT

## 2023-01-06 PROCEDURE — 1036F TOBACCO NON-USER: CPT | Performed by: PHYSICIAN ASSISTANT

## 2023-01-06 PROCEDURE — G8484 FLU IMMUNIZE NO ADMIN: HCPCS | Performed by: PHYSICIAN ASSISTANT

## 2023-01-06 NOTE — PROGRESS NOTES
Neurosurgery Office Note    Chief Complaint: Neck pain, pain in joints    HPI:  49 y.o. 1975  Who presents today as a new patient with complaints of pain in neck, particularly in the right side of his neck that extends into his shoulder. He describes his pain as a stiffness overall. He has had this pain off and on for 20 years. Whenever he experiences flareups he will see a chiropractor which will help with decompression for a few weeks and then his pain will return again. Today he states he is not having a flareup, his pain is a 1 out of 10. He uses ibuprofen, heat/ice for pain management. He does complain of weakness in his hands which has been present for many years. He no longer can play pool and do things that he likes because of his weekend handgrip. He states that he also has developed a tremor in his hands. He did have an EMG of his right upper extremity in November 2022 that showed mild carpal tunnel. He denies any weakness in his lower extremities or numbness/tingling in his lower extremities, does admit to some numbness in his bilateral fingertips. He states that he has an unsteady gait with ambulation, he fell a few months ago which prompted him to see one of our orthopedic providers for his right knee. This provider is who referred this patient to me. He denies any saddle paresthesias or bowel/bladder incontinence, does not have any urinary urgency but does report some increased frequency. Patient tells me that he was told 25 years ago that he had ankylosing spondylitis. This was found through blood work. He did see a physician here in [facility] who put him on steroids for several months but the patient states that he took himself off of all the medications due to rapid weight gain as a side effect. At one point he was following with a physician at [facility] for this but he states that they never could figure out a treatment plan for him.   Looking back through his chart there is no confirmed blood work that I can see. He states that this blood work was done through a different hospital system. Patient is not on any antiplatelets or anticoagulants. PMHx positive for ankylosing spondylitis, gout, thyroid disease. Past Surgical history not listed in chart. Past Medical and Surgical History:       Diagnosis Date    Ankylosing spondylitis ([address]     Gout     High blood pressure     High cholesterol     Migraine     Thyroid disease     Ulcer      History reviewed. No pertinent surgical history. Social History:    TOBACCO:   reports that he has never smoked. He has never used smokeless tobacco.  ETOH:   reports no history of alcohol use. Family History:       Problem Relation Age of Onset    Diabetes Father        Current Medications:    No current facility-administered medications for this visit. No Known Allergies     REVIEW OF SYSTEMS:    CONSTITUTIONAL:  negative for fevers, chills, diaphoresis, activity change, appetite change, fatigue  EYES:  negative for blurred vision, eye discharge, visual disturbance and icterus  HEENT:  negative for hearing loss, tinnitus, ear drainage, sinus pressure, nasal congestion, epistaxis and snoring  RESPIRATORY:  No cough, shortness of breath, hemoptysis  GASTROINTESTINAL:  negative for nausea, vomiting, diarrhea, constipation, blood in stool and abdominal pain  GENITOURINARY:  negative for frequency, dysuria, urinary incontinence, decreased urine volume, and hematuria  HEMATOLOGIC/LYMPHATIC:  negative for easy bruising, bleeding and lymphadenopathy  MUSCULOSKELETAL:  negative for  pain, joint swelling, decreased range of motion and muscle weakness  NEUROLOGICAL:  negative for headaches, slurred speech, unilateral weakness  PSYCHIATRIC/BEHAVIORAL: negative for hallucinations, behavioral problems, confusion and agitation.      Objective:   PHYSICAL EXAM:      VITALS:  /88 (Site: Left Upper Arm, Position: Sitting, Cuff Size: Large Adult)   Pulse (!) 107   Resp 16   Ht 6' 1\" (1.854 m)   Wt 282 lb (127.9 kg)   SpO2 95%   BMI 37.21 kg/m²      24HR INTAKE/OUTPUT:  No intake or output data in the 24 hours ending 01/06/23 1117  CONSTITUTIONAL:  Awake, alert, cooperative, no apparent distress, and appears stated age  [de-identified]: NCAT, PERRL, EOMI. Sclera white, conjunctive full. NECK:  Supple, symmetrical, trachea midline, no adenopathy  PSYCHIATRIC: Oriented to person place and time. No obvious depression or anxiety. MUSCULOSKELETAL: No obvious misalignment or effusion of the joints. No clubbing, cyanosis of the digits. SKIN:  normal skin color, texture, turgor and no redness, warmth, or swelling. NEUROLOGIC: Alert and oriented x4, face symmetrical, no obvious droop, speech clear and coherent, sensation intact to light touch and pinprick sensation except in bilateral fingertips, steady gait with ambulation  Upper extremity strength: Bilateral upper extremities 5/5 throughout except for bilateral handgrip which is 4/5 on the right and 4 -/5 on the left. Positive Jeniffer's bilaterally  Lower extremity strength: Bilateral lower extremities 5/5 throughout, bilateral patellar DTR 2+    DATA:    Old records have been reviewed      Radiology Review:  All pertinent images / reports were reviewed as a part of this visit. Xray cervical spine AP/LAT/FLEX/EX 1/3/23  Impression   Mild degenerative change. EMG right upper extremity 11/28/23  Impression:  #1 Regarding right cervical radiculopathy symptoms:  normal study without axon loss associated with cervical radiculopathy.  - Normal EMG can not rule out painful/sensory radiculopathy, without motor axon loss involvement. Further clinical/radiographic correlation recommended. #2 mild to moderate median neuropathies at the wrist bilaterally, without a great deal of carpal tunnel syndrome symptoms, but occasionally reported.   #3 mild ulnar neuropathy in the left upper limb involving at least the wrist.  Again, not very symptomatic at the current time. Assessment and plan:     1. Cervical myelopathy ([address]   -Patient who presents with complaints of neck pain. Patient has had chronic neck pain for 20 years with intermittent flares consisting of significant stiffness and pain radiating from the right side of his neck up into his scalp and down into his right shoulder. He does not have any numbness/tingling in his arms but does have some numbness in his bilateral fingertips and weakness in his bilateral hands. I did review his cervical x-rays with him completed 1/3/2023. He does have straightening of his usual lordotic curve, no instability noted with flexion/extension. On exam patient does have hyperreflexia in his bilateral upper extremities. He does have gait instability as well. I will order an MRI of the cervical spine to further evaluate potential compressive pathology.  -     MRI CERVICAL SPINE WO CONTRAST; Future  2. Hx of ankylosing spondylitis  -     HLA-B27 ANTIGEN; Future  -     XR THORACOLUMBAR SPINE STANDARD; Future    -Patient states that he was diagnosed with ankylosing spondylitis via blood work many years ago. Has not had any consistent follow-up for this or medical treatment. There are no previous thoracic or lumbar x-rays for me to review. I have ordered these as well as HLA-B27. If the images as well as the blood work is positive I will refer him to Dr. Paul Jimenes with rheumatology for further work-up. Next steps: Obtain blood work and MRI cervical spine and thoracolumbar x-rays. Notify office once tests are completed to review. Electronically signed by Micheal Real PA-C on 1/6/2023 at 11:17 AM      Supervising physician: Brandee Allison MD.  Dr. Chantelle Allison was readily and continuously available by phone for direct consultation regarding the care of this patient.     Time spent with patient in consultation, education, and collaboration with medical time is >50% of total time spent on case, including time spent in chart review and dictation. Total time spent: 30 minutes    Thank you for the opportunity to participate in the care of your patient.

## 2023-01-06 NOTE — PROGRESS NOTES
Letter sent to referring provider Angelina Rush PA-C via In[employer] ([place]) - New patient visit 1/6/23.

## 2023-01-06 NOTE — LETTER
[facility]  [facility] [address]  Phone: [phone]  Fax: [phone]           Hiren Rawls      January 6, 2023     Patient: Nely Soriano   MR Number: [id number]   YOB: 1975   Date of Visit: 1/6/2023       Dear [employer] [facility]:    Thank you for referring Nely Soriano to me for evaluation/treatment. The relevant portions of my assessment and plan of care are available for your review in Epic. If you have questions, please do not hesitate to call me. I look forward to following Gaetano Patten along with you.         Sincerely,        William Gama PA-C

## 2023-01-17 ENCOUNTER — TELEPHONE (OUTPATIENT)
Dept: NEUROSURGERY | Age: 48
End: 2023-01-17

## 2023-01-17 NOTE — TELEPHONE ENCOUNTER
All orders have been faxed to [facility], [facility]. Patient was informed to request a disc of images for all tests being done at [facility]. He was agreeable and verbalized understanding.

## 2023-01-17 NOTE — TELEPHONE ENCOUNTER
Patient called the office today requesting orders for MRI cervical spine and XR thoracolumbar spine ordered by Kylah Adam PA-C and MRI right knee ordered by Sushma Coronado PA-C to be faxed to [address] [place] [address] at [phone]. MRI right knee has been approved by insurance and info faxed to [facility]. MRI cervical spine has not been approved by [facility] yet. This information was entered on face sheet to [facility]. Patient is scheduled for his MRI cervical spine on 1/23/23 at the [address] [place] [address]. Will leave on schedule in case patient cannot get approved to have done with his MRI knee.

## 2023-01-18 ENCOUNTER — TELEPHONE (OUTPATIENT)
Dept: ORTHOPEDIC SURGERY | Age: 48
End: 2023-01-18

## 2023-01-18 NOTE — TELEPHONE ENCOUNTER
Approval # [id number]  Track # [id number]     OVIC NPI: [id number]  Address: [address] [place] [address] second [facility], [address] [place] [address],[address] [facility]    Phone # [phone] CHENG #    Insurance Name - CHENG   Who did I speak with Paresh Krause  Phone # - [phone]  Ref # - [id number]  Time and Date - 01/18/2023  PA ends on 03/15/2023  Approval Spine MRI # [id number]    Insurance Name - CHENG  Who did I speak with Dav Rosa  Phone # - [phone]  Ref # - [id number]  PA ends 02/20/2023  Time and Date - 01/18/2023  Approval Right MRI # [id number]  Changed from [facility] to [facility]

## 2023-01-26 NOTE — TELEPHONE ENCOUNTER
Spoke with [place] at [address] [place] [address] she was informed of the approval for the spine and right knee. Confirmation faxed over to [facility] # [phone].  Telephone # [phone]

## 2023-01-31 ENCOUNTER — HOSPITAL ENCOUNTER (OUTPATIENT)
Age: 48
Discharge: HOME | End: 2023-01-31
Payer: COMMERCIAL

## 2023-01-31 ENCOUNTER — HOSPITAL ENCOUNTER (OUTPATIENT)
Dept: GENERAL RADIOLOGY | Age: 48
Discharge: HOME | End: 2023-01-31
Payer: COMMERCIAL

## 2023-01-31 ENCOUNTER — TELEPHONE (OUTPATIENT)
Dept: NEUROSURGERY | Age: 48
End: 2023-01-31

## 2023-01-31 DIAGNOSIS — Z87.39 HX OF ANKYLOSING SPONDYLITIS: ICD-10-CM

## 2023-01-31 DIAGNOSIS — G95.9 CERVICAL MYELOPATHY (HCC): ICD-10-CM

## 2023-01-31 PROCEDURE — 86812 HLA TYPING A B OR C: CPT

## 2023-01-31 PROCEDURE — 72080 X-RAY EXAM THORACOLMB 2/> VW: CPT

## 2023-01-31 PROCEDURE — 36415 COLL VENOUS BLD VENIPUNCTURE: CPT

## 2023-01-31 NOTE — TELEPHONE ENCOUNTER
Received patient's MRI cervical spine results from [address] [place] [address] that patient obtained today 1/31/23. Report has been scanned into media and images have been pushed into PACS for your review. Patient called the office and I asked him if he obtained the thoracolumbar xray that was ordered along with the MRI. He said he was unaware of the order and will go to the [address] [place] [address] tomorrow 2/1/23 and have done. Do you want to wait until the xray results are completed before doing the telehealth visit? Please advise - thank you.

## 2023-02-01 NOTE — TELEPHONE ENCOUNTER
Left message on voicemail for patient to return call to the office. Per verbal Elaine Betancourt PA-C, can schedule a telehealth appointment tomorrow morning.

## 2023-02-02 ENCOUNTER — TELEMEDICINE (OUTPATIENT)
Dept: NEUROSURGERY | Age: 48
End: 2023-02-02
Payer: COMMERCIAL

## 2023-02-02 DIAGNOSIS — M47.812 CERVICAL SPONDYLOSIS: ICD-10-CM

## 2023-02-02 DIAGNOSIS — M50.90 CERVICAL DISC DISEASE: Primary | ICD-10-CM

## 2023-02-02 PROCEDURE — 99442 PR PHYS/QHP TELEPHONE EVALUATION 11-20 MIN: CPT | Performed by: PHYSICIAN ASSISTANT

## 2023-02-02 RX ORDER — MINOCYCLINE HYDROCHLORIDE 100 MG/1
CAPSULE ORAL
COMMUNITY
Start: 2023-01-18

## 2023-02-02 RX ORDER — TIZANIDINE 4 MG/1
TABLET ORAL
COMMUNITY

## 2023-02-02 RX ORDER — FLUOROURACIL 50 MG/G
CREAM TOPICAL
COMMUNITY
Start: 2023-01-18

## 2023-02-02 RX ORDER — BENZOYL PEROXIDE 100 MG/ML
LIQUID TOPICAL
COMMUNITY
Start: 2023-01-18

## 2023-02-02 RX ORDER — KETOCONAZOLE 20 MG/ML
SHAMPOO TOPICAL
COMMUNITY
Start: 2023-01-18

## 2023-02-02 RX ORDER — SITAGLIPTIN AND METFORMIN HYDROCHLORIDE 1000; 100 MG/1; MG/1
TABLET, FILM COATED, EXTENDED RELEASE ORAL
COMMUNITY
Start: 2023-01-06

## 2023-02-02 RX ORDER — VALACYCLOVIR HYDROCHLORIDE 1 G/1
TABLET, FILM COATED ORAL
COMMUNITY
End: 2023-02-02

## 2023-02-02 RX ORDER — CLINDAMYCIN PHOSPHATE 11.9 MG/ML
SOLUTION TOPICAL
COMMUNITY
Start: 2023-01-18

## 2023-02-02 RX ORDER — TESTOSTERONE CYPIONATE 200 MG/ML
INJECTION INTRAMUSCULAR
COMMUNITY
Start: 2023-01-06

## 2023-02-02 RX ORDER — METHOCARBAMOL 750 MG/1
TABLET, FILM COATED ORAL
COMMUNITY

## 2023-02-02 NOTE — PATIENT INSTRUCTIONS
Notify the neurosurgical office urgently if you begin to develop any numbness or tingling in extremities, weakness in extremities, or loss of bowel or bladder control. Schedule an appointment with Dr Génesis Rosales for potential surgical intervention. [facility], MD  [address] [place] [address] Shannon Kiethiglesiazo [facility], [address] [place] [address]  [phone]      Schedule an appointment with pain management.     [facility]  Dr Tasha Ambriz  [facility]  [address] [phone]

## 2023-02-02 NOTE — PROGRESS NOTES
Bernardo Nation is a 49 y.o. male evaluated via telephone on 2/2/2023 for No chief complaint on file. Documentation:  I communicated with the patient and/or health care decision maker about MRI cervical spine and thoracolumbar xray. Details of this discussion including any medical advice provided:    Patient last saw me in clinic in early January with complaints of neck pain that has been present for roughly 20 years. He reports significant neck pain and tightness along with headaches. He states that he has a headache every day, attributes it to how he sleeps during the night. He does have numbness and tingling that goes into his hands bilaterally, right side is worse. He obtained an MRI of the cervical spine on 1/31/2023. It shows that the paraspinal soft tissues are unremarkable and without prevertebral edema. Patient does have disc disease, most significant at C5-6 with moderate bilateral foraminal narrowing. Patient does have straightening of his lordotic curve noted on x-rays. I believe that patient would benefit from cervical trigger point injections, referral has been sent to Dr. Keara Bhandari for this. Patient would benefit from physical therapy as well after he gets the trigger point injections. Patient is interested in meeting with a spine surgeon about his disc disease and foraminal narrowing. Will refer to Dr. Arnol Carbone. Patient told me during his last office visit that he was diagnosed with ankylosing spondylitis. I did order x-rays of his thoracic and lumbar spine to further evaluate this, and I will ankylosing spondylitis signs were noted on his x-ray. HLA-B27 lab is pending. Patient understands that I will update him on the results of this and will consult Dr. Dary Quesada if it is positive. Total Time: minutes: 11-20 minutes    MRI cervical spine 1/31/23  Impression:  No acute abnormality of the cervical spine. Multilevel mild to moderate degenerative changes as above.   Moderate bilateral foraminal narrowing is present at C5/6. Thoracolumbar xray 1/31/23  Impression   Mild degenerative changes without acute osseous abnormality. HLA B-27 lab- pending    Severo Hartshorn was evaluated through a synchronous (real-time) audio encounter. Patient identification was verified at the start of the visit. He (or guardian if applicable) is aware that this is a billable service, which includes applicable co-pays. This visit was conducted with the patient's (and/or legal guardian's) verbal consent. He has not had a related appointment within my department in the past 7 days or scheduled within the next 24 hours. The patient was located at [facility].   The provider was located at  [place] [address]    Note: not billable if this call serves to triage the patient into an appointment for the relevant concern    Chuckie Christianson PA-C

## 2023-02-02 NOTE — PROGRESS NOTES
Florencio Grandchild is a 49 y.o. male evaluated via telephone on 2/2/2023 for Telephone Appointment Visit and Results (MRI cervical spine - DOS 1/31/23)      MRI Cervical Spine w/out Contrast 1/31/23  Impression:  No acute abnormality of the cervical spine. Multilevel mild to moderate degenerative changes as above. Moderate bilateral foraminal narrowing is present at C5-6.

## 2023-02-03 ENCOUNTER — CLINICAL DOCUMENTATION (OUTPATIENT)
Dept: NEUROSURGERY | Age: 48
End: 2023-02-03

## 2023-02-04 LAB — HLA-B27 QL FC: POSITIVE

## 2023-02-06 ENCOUNTER — TELEPHONE (OUTPATIENT)
Dept: NEUROSURGERY | Age: 48
End: 2023-02-06

## 2023-02-06 DIAGNOSIS — Z87.39 HX OF ANKYLOSING SPONDYLITIS: Primary | ICD-10-CM

## 2023-02-06 DIAGNOSIS — Z15.89 HLA B27 POSITIVE: ICD-10-CM

## 2023-02-06 NOTE — TELEPHONE ENCOUNTER
----- Message from Mana Preciado PA-C sent at 2/5/2023  8:15 AM EST -----  Please refer him to Dr. Duane Em for positive HLA B27, hx of ankylosis spondylitis per the patient.  Thank you

## 2023-02-13 ENCOUNTER — HOSPITAL ENCOUNTER (OUTPATIENT)
Age: 48
Discharge: HOME | End: 2023-02-13
Payer: COMMERCIAL

## 2023-02-13 LAB
CHOLEST SERPL-MCNC: 209 MG/DL
ESTIMATED AVERAGE GLUCOSE: 174 MG/DL
HBA1C MFR BLD: 7.7 % (ref 4.2–6.3)
HDLC SERPL-MCNC: 34 MG/DL
LDLC SERPL CALC-MCNC: 139 MG/DL
TRIGL SERPL-MCNC: 179 MG/DL

## 2023-02-13 PROCEDURE — 80061 LIPID PANEL: CPT

## 2023-02-13 PROCEDURE — 83036 HEMOGLOBIN GLYCOSYLATED A1C: CPT

## 2023-02-13 PROCEDURE — 36415 COLL VENOUS BLD VENIPUNCTURE: CPT

## 2023-03-08 NOTE — PROGRESS NOTES
RHEUMATOLOGY NEW PATIENT VISIT    3/9/2023      Patient Name: Peter Jennings  : 1975  Medical Record: [id number]      CHIEF COMPLAINT    Ankylosing spondylitis  HLA-B27 positive    Pertinent Problems  Cervical radiculopathy  Right knee sprain  Hx of chronic gout     HISTORY OF PRESENT ILLNESS    Peter Jennings is a 49 y.o. male who was referred by Ritesh Raderfor above concerns. Diagnosed with AS since >20 years ago based on neck, low back, knees,ankles, shoulders pain. He established with Dr Anna Stafford who started SSZ that caused weight gain and pain did not improve. He was also seen at [facility] rheumatology, Xray findings were negative, MRI of the knee was recommended but patient was lost to f/u. Disease progression:   Today, patient describes joint pain in fingers, knees, ankle and back   Joint stiffness in am lasting few hours. Pain is always worse in the early morning. There is no swelling, more of stiffness   Relieving factors: activity   Worsening factors: resting   Associated symptoms: Muscle pain+ Psoriasis, dactylitis, enthesitis, uveitis, abdominal pain, chronic diarrhea, or skin rash. Pain level today: 10/10 last night, 0/10 currently. No recent hospitalizations or fever/infections  Functional status:  unemployed     Other rheumatologic symptoms :  Denies chest pain, SOB, fever, rash, oral/nasal ulcers, change in mental status, sicca symptoms,  muscle weakness, raynaud's, puffy fingers or skin thickening. History of miscarriages, blood clots, dactylitis, uveitis, enthesitis, psoriasis, buttock pain, change in BM    Risk factors: no smoking, obesity+ , no recreational drug use, sister has Ankylosing spondylitis    Current rheum meds: none    Past rheum meds: none    No flowsheet data found.         REVIEW OF SYSTEMS     Constitutional:  Denies fever or chills, decreased appetite, or weight loss   Eyes:  Denies change in visual acuity or eye dryness or irritation  HENT:  Denies dry mouth or oral ulcers  Respiratory:  Denies cough or shortness of breath   Cardiovascular:  Denies chest pain or edema   GI:  Denies abdominal pain, nausea, vomiting, bloody stools or diarrhea   :  Denies dysuria or hematuria  Musculoskeletal:  See HPI  Integument:  Denies rash   Neurologic:  Denies headache, focal weakness or sensory changes   Endocrine:  Denies polyuria or polydipsia   Lymphatic:  Denies swollen glands   Psychiatric:  Denies depression or anxiety       PROBLEM LIST    There is no problem list on file for this patient. MEDICATIONS    Current Outpatient Medications   Medication Sig Dispense Refill    Omega-3 Fatty Acids (FISH OIL PO) Fish Oil      BENZOYL PEROXIDE 10 % external wash WASH THE AFFECTED AREA(S) BY TOPICAL ROUTE ONCE DAILY face, groin      clindamycin (CLEOCIN T) 1 % external solution apply twice daily to the face      ketoconazole (NIZORAL) 2 % shampoo apply daily to the scalp, beard      minocycline (MINOCIN;DYNACIN) 100 MG capsule Take 1 capsule twice a day by oral route with meals for 30 days. testosterone cypionate (DEPOTESTOTERONE CYPIONATE) 200 MG/ML injection INJECT 0.5ML INTRAMUSCULARLY EVERY 2 WEEKS. DISCARD REMAINDER      tiZANidine (ZANAFLEX) 4 MG tablet PRN FOR FLARES      JANUMET -1000 MG TB24 TAKE ONE TABLET BY MOUTH EVERY EVENING WITH FOOD      meloxicam (MOBIC) 15 MG tablet prn      ibuprofen (ADVIL;MOTRIN) 800 MG tablet Take 800 mg by mouth every 6 hours as needed. fluorouracil (EFUDEX) 5 % cream APPLY TO FOREHEAD, TEMPLES AND EARS TWICE DAILY FOR 3 WEEKS (Patient not taking: Reported on 3/9/2023)      methocarbamol (ROBAXIN) 750 MG tablet methocarbamol 750 mg tablet       No current facility-administered medications for this visit.        ALLERGIES    No Known Allergies    PAST MEDICAL HISTORY      Past Medical History:   Diagnosis Date    Ankylosing spondylitis ([facility] [address]     Gout     High blood pressure     High cholesterol     Migraine     Thyroid disease     Ulcer          SOCIAL HISTORY     Social History     Socioeconomic History    Marital status:    Tobacco Use    Smoking status: Never    Smokeless tobacco: Never   Vaping Use    Vaping Use: Never used   Substance and Sexual Activity    Alcohol use: No    Drug use: No    Sexual activity: Never         FAMILY HISTORY     Family History   Problem Relation Age of Onset    Diabetes Father          PHYSICAL EXAM     Wt Readings from Last 3 Encounters:   03/09/23 285 lb (129.3 kg)   01/06/23 282 lb (127.9 kg)   12/14/22 280 lb (127 kg)     Temp Readings from Last 3 Encounters:   03/18/22 97.7 °F (36.5 °C) (Oral)   12/10/13 98.1 °F (36.7 °C) (Oral)   05/31/13 98 °F (36.7 °C) (Oral)     BP Readings from Last 3 Encounters:   03/09/23 125/80   01/06/23 132/88   12/14/22 114/70     Pulse Readings from Last 3 Encounters:   03/09/23 97   01/06/23 (!) 107   12/14/22 78       General appearance:  Alert and oriented, NAD, well developed   HEENT: EOMI, no scleral injection, moist mucous membranes, no oral ulcers, normal hearing, no cartilage inflammation  Neck: Trachea midline, no masses  Lymph: no LAD  Lungs: CTAB, no rales  Heart: regular rate and rhythm, S1, S2 normal, no murmur, no lower extremity edema  Abdomen: Soft, ND, NT. + BS. Extremities: atraumatic, no cyanosis or edema. Neurologic: CN 2-12 grossly intact. Skin: No active rashes, warm and dry, no telangiectasias, no digital pitting, no sclerodactyly, no rheumatoid nodules, no livedo  MSK:   HANDS: MCP, PIP and DIP tenderness +good ROM,   Elbow: No synovitis, good ROM,   Shoulder:good ROM,   Knee: no effusion, good ROM,   Ankle:good ROM,   FEET: right positive forefoot squeeze test, right MTP tenderness+    Spine:  Normal range of motion; no tender points, no obvious deformities. Neuro:  Alert & oriented x 3, normal motor function,   Muscle strength: 4/4 in bilateral upper and lower extremities.     Psychiatric: Mood and affect are appropriate, recent and remote memory normal,      LABS AND IMAGING  Available labs were reviewed and discussed with patient   1/31/2023  HLA-B27 positive  WBC normal  Hemoglobin normal  Platelet count normal  Albumin normal  Hemoglobin A1c 7.7    I reviewed cervical spine x-rays and thoracolumbar spine x-rays that showed mild DJD without periosteal changes. MRI cervical spine on 1/31/2023 showed absence of infiltrative marrow signal intensity. There was no prevertebral edema. There was spurring at C2-C3 and there were disc osteophytes at C4-C5. Assessment   Patient is a 50 yo male with hx of AS, HLA b27+, DJD presenting with chronic widespread joint pain, more of stiffness with minimal swelling. Back pain is severe, affects his sleep and worse in early a.m. SSZ did not help him in the past, prednisone caused a lot of weight gain. If axial disease is present, will plan for humira. 1. Ankylosing spondylitis, unspecified site of spine ([facility] [address]  2. HLA B27 (HLA B27 positive)  - Ambulatory referral to Physical Therapy; Future  - Quantiferon, Incubated; Future  - Uric Acid; Future  - Rheumatoid Factor; Future  - XR HAND RIGHT (MIN 3 VIEWS); Future  - XR HAND LEFT (MIN 3 VIEWS); Future  - XR CHEST (2 VW); Future  - Hepatitis Panel, Acute; Future  - Cyclic Citrul Peptide Antibody, IgG; Future  - C-Reactive Protein; Future  - Sedimentation Rate; Future  - Renal Function Panel; Future  - CBC; Future  - Vitamin D 25 Hydroxy; Future  - XR FOOT LEFT (MIN 3 VIEWS); Future  - XR FOOT RIGHT (MIN 3 VIEWS); Future  - Hepatic Function Panel; Future  - MRI PELVIS WO CONTRAST; Future  - MRI LUMBAR SPINE WO CONTRAST; Future    3. Encounter for other specified special examinations  - Hepatitis Panel, Acute; Future    4. Class 2 severe obesity with serious comorbidity and body mass index (BMI) of 37.0 to 37.9 in adult, unspecified obesity type (HCC)  - Vitamin D 25 Hydroxy; Future    5.  Chronic gout of multiple sites, unspecified cause  - Uric Acid; Future     Patient Instructions  Complete ordered labs, Xrays and get MRI done  We will discuss results at next visit  Continue mobic 15mg daily as needed for severe pain. RTC in 2-3 weeks       -  The patient indicates understanding of these issues and agrees with the plan.     I spent  50  minutes on the date of service, preparing to see the patient (eg, review of tests), obtaining and/or reviewing separately obtained history, counseling and educating the family/caregiver, ordering medications, tests, or procedures, referring and communicating with other health care professionals, documenting clinical information in the electronic or other health record, care coordination (not separately reported)      Lizette Potter MD

## 2023-03-09 ENCOUNTER — OFFICE VISIT (OUTPATIENT)
Dept: RHEUMATOLOGY | Age: 48
End: 2023-03-09
Payer: COMMERCIAL

## 2023-03-09 VITALS
BODY MASS INDEX: 37.6 KG/M2 | OXYGEN SATURATION: 97 % | WEIGHT: 285 LBS | DIASTOLIC BLOOD PRESSURE: 80 MMHG | SYSTOLIC BLOOD PRESSURE: 125 MMHG | HEART RATE: 97 BPM

## 2023-03-09 DIAGNOSIS — E66.01 CLASS 2 SEVERE OBESITY WITH SERIOUS COMORBIDITY AND BODY MASS INDEX (BMI) OF 37.0 TO 37.9 IN ADULT, UNSPECIFIED OBESITY TYPE (HCC): ICD-10-CM

## 2023-03-09 DIAGNOSIS — Z15.89 HLA B27 (HLA B27 POSITIVE): ICD-10-CM

## 2023-03-09 DIAGNOSIS — Z01.89 ENCOUNTER FOR OTHER SPECIFIED SPECIAL EXAMINATIONS: ICD-10-CM

## 2023-03-09 DIAGNOSIS — M1A.09X0 CHRONIC GOUT OF MULTIPLE SITES, UNSPECIFIED CAUSE: ICD-10-CM

## 2023-03-09 DIAGNOSIS — M45.9 ANKYLOSING SPONDYLITIS, UNSPECIFIED SITE OF SPINE (HCC): Primary | ICD-10-CM

## 2023-03-09 PROCEDURE — G8417 CALC BMI ABV UP PARAM F/U: HCPCS | Performed by: STUDENT IN AN ORGANIZED HEALTH CARE EDUCATION/TRAINING PROGRAM

## 2023-03-09 PROCEDURE — 1036F TOBACCO NON-USER: CPT | Performed by: STUDENT IN AN ORGANIZED HEALTH CARE EDUCATION/TRAINING PROGRAM

## 2023-03-09 PROCEDURE — G8484 FLU IMMUNIZE NO ADMIN: HCPCS | Performed by: STUDENT IN AN ORGANIZED HEALTH CARE EDUCATION/TRAINING PROGRAM

## 2023-03-09 PROCEDURE — 99205 OFFICE O/P NEW HI 60 MIN: CPT | Performed by: STUDENT IN AN ORGANIZED HEALTH CARE EDUCATION/TRAINING PROGRAM

## 2023-03-09 PROCEDURE — G8427 DOCREV CUR MEDS BY ELIG CLIN: HCPCS | Performed by: STUDENT IN AN ORGANIZED HEALTH CARE EDUCATION/TRAINING PROGRAM

## 2023-03-09 NOTE — PATIENT INSTRUCTIONS
Complete ordered labs, Xrays and get MRI done  We will discuss results at next visit  Continue mobic 15mg daily as needed for severe pain.  RTC in 2-3 weeks

## 2023-03-13 ENCOUNTER — HOSPITAL ENCOUNTER (OUTPATIENT)
Dept: GENERAL RADIOLOGY | Age: 48
Discharge: HOME | End: 2023-03-13
Payer: COMMERCIAL

## 2023-03-13 ENCOUNTER — HOSPITAL ENCOUNTER (OUTPATIENT)
Age: 48
Discharge: HOME | End: 2023-03-13
Payer: COMMERCIAL

## 2023-03-13 DIAGNOSIS — M45.9 ANKYLOSING SPONDYLITIS, UNSPECIFIED SITE OF SPINE (HCC): ICD-10-CM

## 2023-03-13 DIAGNOSIS — Z15.89 HLA B27 (HLA B27 POSITIVE): ICD-10-CM

## 2023-03-13 DIAGNOSIS — Z01.89 ENCOUNTER FOR OTHER SPECIFIED SPECIAL EXAMINATIONS: ICD-10-CM

## 2023-03-13 DIAGNOSIS — E66.01 CLASS 2 SEVERE OBESITY WITH SERIOUS COMORBIDITY AND BODY MASS INDEX (BMI) OF 37.0 TO 37.9 IN ADULT, UNSPECIFIED OBESITY TYPE (HCC): ICD-10-CM

## 2023-03-13 DIAGNOSIS — M1A.09X0 CHRONIC GOUT OF MULTIPLE SITES, UNSPECIFIED CAUSE: ICD-10-CM

## 2023-03-13 LAB
25(OH)D3 SERPL-MCNC: 15.39 NG/ML
ALBUMIN SERPL-MCNC: 4.7 GM/DL (ref 3.4–5)
ALBUMIN SERPL-MCNC: 4.7 GM/DL (ref 3.4–5)
ALP BLD-CCNC: 70 IU/L (ref 40–129)
ALT SERPL-CCNC: 35 U/L (ref 10–40)
ANION GAP SERPL CALCULATED.3IONS-SCNC: 12 MMOL/L (ref 4–16)
AST SERPL-CCNC: 17 IU/L (ref 15–37)
BILIRUB SERPL-MCNC: 0.2 MG/DL (ref 0–1)
BILIRUBIN DIRECT: 0.2 MG/DL (ref 0–0.3)
BILIRUBIN, INDIRECT: 0 MG/DL (ref 0–0.7)
BUN SERPL-MCNC: 11 MG/DL (ref 6–23)
CALCIUM SERPL-MCNC: 9.6 MG/DL (ref 8.3–10.6)
CHLORIDE BLD-SCNC: 97 MMOL/L (ref 99–110)
CO2: 25 MMOL/L (ref 21–32)
CREAT SERPL-MCNC: 0.7 MG/DL (ref 0.9–1.3)
CRP SERPL HS-MCNC: 3 MG/L
ERYTHROCYTE SEDIMENTATION RATE: 10 MM/HR (ref 0–15)
GFR SERPL CREATININE-BSD FRML MDRD: >60 ML/MIN/1.73M2
GLUCOSE SERPL-MCNC: 246 MG/DL (ref 70–99)
HCT VFR BLD CALC: 53.8 % (ref 42–52)
HEMOGLOBIN: 17.7 GM/DL (ref 13.5–18)
MCH RBC QN AUTO: 31.9 PG (ref 27–31)
MCHC RBC AUTO-ENTMCNC: 32.9 % (ref 32–36)
MCV RBC AUTO: 96.9 FL (ref 78–100)
PDW BLD-RTO: 12.7 % (ref 11.7–14.9)
PHOSPHORUS: 4.1 MG/DL (ref 2.5–4.9)
PLATELET # BLD: 271 K/CU MM (ref 140–440)
PMV BLD AUTO: 10 FL (ref 7.5–11.1)
POTASSIUM SERPL-SCNC: 4.7 MMOL/L (ref 3.5–5.1)
RBC # BLD: 5.55 M/CU MM (ref 4.6–6.2)
SODIUM BLD-SCNC: 134 MMOL/L (ref 135–145)
TOTAL PROTEIN: 7.5 GM/DL (ref 6.4–8.2)
URATE SERPL-MCNC: 4 MG/DL (ref 3.5–7.2)
WBC # BLD: 9.2 K/CU MM (ref 4–10.5)

## 2023-03-13 PROCEDURE — 84550 ASSAY OF BLOOD/URIC ACID: CPT

## 2023-03-13 PROCEDURE — 84100 ASSAY OF PHOSPHORUS: CPT

## 2023-03-13 PROCEDURE — 73630 X-RAY EXAM OF FOOT: CPT

## 2023-03-13 PROCEDURE — 85027 COMPLETE CBC AUTOMATED: CPT

## 2023-03-13 PROCEDURE — 86200 CCP ANTIBODY: CPT

## 2023-03-13 PROCEDURE — 71046 X-RAY EXAM CHEST 2 VIEWS: CPT

## 2023-03-13 PROCEDURE — 86140 C-REACTIVE PROTEIN: CPT

## 2023-03-13 PROCEDURE — 86480 TB TEST CELL IMMUN MEASURE: CPT

## 2023-03-13 PROCEDURE — 82248 BILIRUBIN DIRECT: CPT

## 2023-03-13 PROCEDURE — 73130 X-RAY EXAM OF HAND: CPT

## 2023-03-13 PROCEDURE — 85652 RBC SED RATE AUTOMATED: CPT

## 2023-03-13 PROCEDURE — 82306 VITAMIN D 25 HYDROXY: CPT

## 2023-03-13 PROCEDURE — 36415 COLL VENOUS BLD VENIPUNCTURE: CPT

## 2023-03-13 PROCEDURE — 80074 ACUTE HEPATITIS PANEL: CPT

## 2023-03-13 PROCEDURE — 86430 RHEUMATOID FACTOR TEST QUAL: CPT

## 2023-03-13 PROCEDURE — 80053 COMPREHEN METABOLIC PANEL: CPT

## 2023-03-14 LAB
HAV IGM SERPL QL IA: NON REACTIVE
HBV CORE IGM SERPL QL IA: NON REACTIVE
HBV SURFACE AG SERPL QL IA: NON REACTIVE
HCV AB SERPL QL IA: NON REACTIVE
RHEUMATOID FACTOR: <10 IU/ML (ref 0–14)

## 2023-03-15 LAB
CCP IGG SERPL-ACNC: 8 UNITS (ref 0–19)
QUANTI TB1 MINUS NIL: 0.02 IU/ML (ref 0–0.34)
QUANTI TB2 MINUS NIL: 0.01 IU/ML (ref 0–0.34)
QUANTIFERON (R) TB GOLD (INCUBATED): NEGATIVE IU/ML
QUANTIFERON MITOGEN MINUS NIL: >10 IU/ML
QUANTIFERON NIL: 0.02 IU/ML

## 2023-03-24 ENCOUNTER — OFFICE VISIT (OUTPATIENT)
Dept: ORTHOPEDIC SURGERY | Age: 48
End: 2023-03-24
Payer: COMMERCIAL

## 2023-03-24 VITALS
HEART RATE: 89 BPM | SYSTOLIC BLOOD PRESSURE: 139 MMHG | OXYGEN SATURATION: 94 % | DIASTOLIC BLOOD PRESSURE: 98 MMHG | BODY MASS INDEX: 37.6 KG/M2 | HEIGHT: 73 IN

## 2023-03-24 DIAGNOSIS — M75.41 SUBACROMIAL IMPINGEMENT OF RIGHT SHOULDER: ICD-10-CM

## 2023-03-24 DIAGNOSIS — S83.91XA SPRAIN OF RIGHT KNEE, UNSPECIFIED LIGAMENT, INITIAL ENCOUNTER: Primary | ICD-10-CM

## 2023-03-24 PROCEDURE — 1036F TOBACCO NON-USER: CPT | Performed by: PHYSICIAN ASSISTANT

## 2023-03-24 PROCEDURE — G8427 DOCREV CUR MEDS BY ELIG CLIN: HCPCS | Performed by: PHYSICIAN ASSISTANT

## 2023-03-24 PROCEDURE — G8417 CALC BMI ABV UP PARAM F/U: HCPCS | Performed by: PHYSICIAN ASSISTANT

## 2023-03-24 PROCEDURE — 99212 OFFICE O/P EST SF 10 MIN: CPT | Performed by: PHYSICIAN ASSISTANT

## 2023-03-24 PROCEDURE — 20610 DRAIN/INJ JOINT/BURSA W/O US: CPT | Performed by: PHYSICIAN ASSISTANT

## 2023-03-24 PROCEDURE — G8484 FLU IMMUNIZE NO ADMIN: HCPCS | Performed by: PHYSICIAN ASSISTANT

## 2023-03-24 NOTE — PATIENT INSTRUCTIONS
Continue weight-bearing as tolerated. Continue range of motion exercises as instructed. Ice and elevate as needed. Tylenol or Motrin for pain. Steroid injection given in right shoulder today. Follow up in 2-3 weeks for left shoulder.

## 2023-03-24 NOTE — PROGRESS NOTES
medication. A sterile bandage was placed over the injection site. The patient tolerated the procedure well without complication.

## 2023-03-27 ENCOUNTER — TELEPHONE (OUTPATIENT)
Dept: RHEUMATOLOGY | Age: 48
End: 2023-03-27

## 2023-03-27 ASSESSMENT — ENCOUNTER SYMPTOMS
RESPIRATORY NEGATIVE: 1
EYES NEGATIVE: 1
GASTROINTESTINAL NEGATIVE: 1

## 2023-03-27 NOTE — TELEPHONE ENCOUNTER
----- Message from Madeline Whitten MD sent at 3/25/2023  6:45 AM EDT -----  Kindly notify patient MRI pelvis was not approved by his insurance. They recommend to get x-ray pelvis to be done first.  It has been ordered. He should get it done.

## 2023-05-31 ENCOUNTER — HOSPITAL ENCOUNTER (OUTPATIENT)
Dept: PHYSICAL THERAPY | Age: 48
Setting detail: THERAPIES SERIES
Discharge: HOME | End: 2023-05-31
Attending: STUDENT IN AN ORGANIZED HEALTH CARE EDUCATION/TRAINING PROGRAM
Payer: COMMERCIAL

## 2023-05-31 DIAGNOSIS — M45.9 ANKYLOSING SPONDYLITIS, UNSPECIFIED SITE OF SPINE (HCC): ICD-10-CM

## 2023-05-31 DIAGNOSIS — Z15.89 HLA B27 (HLA B27 POSITIVE): ICD-10-CM

## 2023-05-31 PROCEDURE — 97162 PT EVAL MOD COMPLEX 30 MIN: CPT

## 2023-05-31 PROCEDURE — 97110 THERAPEUTIC EXERCISES: CPT

## 2023-05-31 ASSESSMENT — PAIN SCALES - GENERAL: PAINLEVEL_OUTOF10: 1

## 2023-05-31 NOTE — PROGRESS NOTES
Physical Therapy: Initial Evaluation    Patient: Toby Kelsey (68 y.o. male)   Examination Date:   Plan of Care Certification Period: 2023 to        :  1975 ;    Confirmed: Yes MRN: [id number]  CSN: [id number]   Insurance: Payor: Glenn Prasad / Plan: Olene Spruce / Product Type: *No Product type* /   Insurance ID: [id number] - (Medicaid Managed) Secondary Insurance (if applicable):    Referring Physician: Stephan Bence, MD     PCP: Zach Shahid MD Visits to Date/Visits Approved:   /      No Show/Cancelled Appts:   /       Medical Diagnosis: Ankylosing spondylitis, unspecified site of spine ([facility] [M45.9]  HLA B27 (HLA B27 positive) [Z15.89]    Treatment Diagnosis: cervical pain with headaches     PERTINENT MEDICAL HISTORY   Patient Assessed for Rehabilitation Services: Yes       Medical History: Chart Reviewed: Yes   Past Medical History:   Diagnosis Date    Ankylosing spondylitis ([facility]     Gout     High blood pressure     High cholesterol     Migraine     Thyroid disease     Ulcer      Surgical History: No past surgical history on file.     Medications:   Current Outpatient Medications:     vitamin D3 (CHOLECALCIFEROL) 25 MCG (1000 UT) TABS tablet, Take 1 tablet by mouth daily, Disp: 90 tablet, Rfl: 1    Omega-3 Fatty Acids (FISH OIL PO), Fish Oil, Disp: , Rfl:     BENZOYL PEROXIDE 10 % external wash, WASH THE AFFECTED AREA(S) BY TOPICAL ROUTE ONCE DAILY face, groin, Disp: , Rfl:     clindamycin (CLEOCIN T) 1 % external solution, apply twice daily to the face, Disp: , Rfl:     fluorouracil (EFUDEX) 5 % cream, APPLY TO FOREHEAD, TEMPLES AND EARS TWICE DAILY FOR 3 WEEKS (Patient not taking: No sig reported), Disp: , Rfl:     ketoconazole (NIZORAL) 2 % shampoo, apply daily to the scalp, beard, Disp: , Rfl:     methocarbamol (ROBAXIN) 750 MG tablet, methocarbamol 750 mg tablet, Disp: , Rfl:     minocycline (MINOCIN;DYNACIN) 100 MG capsule, Take 1 capsule twice a day

## 2023-05-31 NOTE — PRE-CERTIFICATION NOTE
PT APPROVED  UNITS OF EACH CPT CODE [id number]    [id number]  DNT NOT COVERED     5/31/23-8/31/23

## 2023-05-31 NOTE — PLAN OF CARE
Outpatient Physical Therapy           [place]           [x] Phone: [phone]   Fax: [phone]  [facility]           [] Phone: [phone]   Fax: [phone]     To: Dg Quiles     From: Rai Mo, PT, DPT     Patient: Karthik Camacho       : 1975  Diagnosis: Ankylosing spondylitis, unspecified site of spine (HCC) [M45.9]  HLA B27 (HLA B27 positive) [Z15.89]    Treatment Diagnosis: cervical pain with headaches  Date: 2023    Physical Therapy Certification/Re-Certification Form  Dear Dr. Fuad Hess MD  The following patient has been evaluated for physical therapy services and for therapy to continue, insurance requires physician review of the treatment plan initially and every 90 days. Please review the attached evaluation and/or summary of the patient's plan of care, and verify that you agree therapy should continue by signing the attached document and sending it back to our office. Assessment:    Assessment: pt is a 51 y.o. male that presents to therapy with complaints of neck and bilateral shoulder pain, tightness and headaches lasting about 25 years. pt is HLA B27 positive and at risk for autoimmune diseases. pt demo impaired cervical ROM, BLE strength, activity tolerance, functional mobility, severe B UT tightness and poor posture with a cervical hump. spurlings was negative and distraction test positive. pt has been stretching, doing SNAGS and cervical traction at home. DNT was discussed with pt and he does not want to try that at this time. Massage therapists list given to pt. pt may try chiro as well. Pt would benefit from continued skilled physical therapy to address impairments and limitations, progress toward goal completion, promote independence with ADLs, and prevent further injury. Patient agrees with established plan of care and assisted in the development of their short term and long term goals.  Patient had no adverse reaction with initial treatment and

## 2023-05-31 NOTE — FLOWSHEET NOTE
and he does not want to try that at this time. Massage therapists list given to pt. pt may try chiro as well. Pt would benefit from continued skilled physical therapy to address impairments and limitations, progress toward goal completion, promote independence with ADLs, and prevent further injury. Subjective:  See eval         Any changes in Ambulatory Summary Sheet? None        Objective:  See eval           Exercises: (No more than 4 columns)   Exercise/Equipment 5/31/23 #1 Date Date           WARM UP      UBE         puley      TABLE      LS stretch X30\" ea     Cervical rotation SNAGS Demoed- was doing at home     Supine chin tucks                     STANDING      Horz abd RTB x10     Taffy pull      Doorway pec stretch                                   PROPRIOCEPTION                                    MODALITIES                      Other Therapeutic Activities/Education:  HEP and importance of completion, POC and goals, anatomy and physiology related to condition        Home Exercise Program:  Issued, practiced and pt demo ability to perform on eval date  5/31: cervical rotation SNAGS, LS stretch, horz abd. RTB given      Manual Treatments:  none      Modalities:  none      Communication with other providers:        Assessment:    Assessment: pt is a 51 y.o. male that presents to therapy with complaints of neck and bilateral shoulder pain, tightness and headaches lasting about 25 years. pt is HLA B27 positive and at risk for autoimmune diseases. pt demo impaired cervical ROM, BLE strength, activity tolerance, functional mobility, severe B UT tightness and poor posture with a cervical hump. spurlings was negative and distraction test positive. pt has been stretching, doing SNAGS and cervical traction at home. DNT was discussed with pt and he does not want to try that at this time. Massage therapists list given to pt. pt may try chiro as well.  Pt would benefit from continued skilled physical therapy to

## 2023-06-07 ENCOUNTER — HOSPITAL ENCOUNTER (OUTPATIENT)
Dept: PHYSICAL THERAPY | Age: 48
Setting detail: THERAPIES SERIES
Discharge: HOME | End: 2023-06-07
Attending: STUDENT IN AN ORGANIZED HEALTH CARE EDUCATION/TRAINING PROGRAM
Payer: COMMERCIAL

## 2023-06-07 PROCEDURE — 97140 MANUAL THERAPY 1/> REGIONS: CPT

## 2023-06-07 NOTE — FLOWSHEET NOTE
Outpatient Physical Therapy  [place]           [x] Phone: [phone]   Fax: [phone]  [facility]           [] Phone: [phone]   Fax: [phone]        Physical Therapy Daily Treatment Note  Date:  2023    Patient Name:  Serge Gonzales    :  1975  MRN: [id number]  Restrictions/Precautions: Restrictions/Precautions: General Precautions        Diagnosis: Ankylosing spondylitis, unspecified site of spine ([facility] [address] [M45.9]  HLA B27 (HLA B27 positive) [Z15.89]    Date of Injury/Surgery:   Treatment Diagnosis:  cervical pain with headaches  Insurance/Certification information: [facility]  Referring Physician:  Angel Hernandez*     PCP: Reji Aguilera MD  Next Doctor Visit:    Plan of care signed (Y/N):  Yes  Outcome Measure: NDI:   Visit# / total visits:  2 /6 by 23      Pain level: 5/10   Goals:     Patient goals: reduce pain, tightness and headaches  Short term goals  Time Frame for Short term goals: refer to LTG     Long Term Goals  Time Frame for Long Term Goals: 6 visits  Pt will report overall improvement in condition by 50% or more  pt will improve NDI to 12/50 or less to show [address] and subjective improvement  pt will improve cervical flexion AROM to 40 deg to look down to read  pt will improve cervical L SB AROM to 40 deg to perform ADLS  pt will report a decrease in pain with bilateral cervical rotation for looking over shoulder while driving      Summary of Evaluation:  Assessment: pt is a 51 y.o. male that presents to therapy with complaints of neck and bilateral shoulder pain, tightness and headaches lasting about 25 years. pt is HLA B27 positive and at risk for autoimmune diseases. pt demo impaired cervical ROM, BLE strength, activity tolerance, functional mobility, severe B UT tightness and poor posture with a cervical hump. spurlings was negative and distraction test positive. pt has been stretching, doing SNAGS and cervical traction at home.  DNT was discussed

## 2023-07-06 ENCOUNTER — OFFICE VISIT (OUTPATIENT)
Dept: ORTHOPEDIC SURGERY | Age: 48
End: 2023-07-06

## 2023-07-06 VITALS
OXYGEN SATURATION: 97 % | HEART RATE: 74 BPM | SYSTOLIC BLOOD PRESSURE: 136 MMHG | DIASTOLIC BLOOD PRESSURE: 90 MMHG | RESPIRATION RATE: 16 BRPM | WEIGHT: 283 LBS | HEIGHT: 73 IN | BODY MASS INDEX: 37.51 KG/M2

## 2023-07-06 DIAGNOSIS — M75.42 SUBACROMIAL IMPINGEMENT OF LEFT SHOULDER: ICD-10-CM

## 2023-07-06 DIAGNOSIS — M75.41 SUBACROMIAL IMPINGEMENT OF RIGHT SHOULDER: Primary | ICD-10-CM

## 2023-07-06 ASSESSMENT — ENCOUNTER SYMPTOMS
GASTROINTESTINAL NEGATIVE: 1
EYES NEGATIVE: 1
RESPIRATORY NEGATIVE: 1

## 2023-07-06 NOTE — PROGRESS NOTES
or Motrin for pain  Injection given into the bilateral shoulder  No high impact activities for a least 24-48 hours  Call office if there is persistent pains in the right shoulder please contact off and we will consider an MRI  Follow up as needed    We are committed to providing you the best care possible. If you receive a survey after visiting one of our offices, please take time to share your experience concerning your physician office visit. These surveys are confidential and no health information about you is shared. We are eager to improve for you and we are counting on your feedback to help make that happen. left Subacromial Bursa Aspiration / Injection Procedure:  Multiple treatment options were discussed. This injection was recommended as a part of the overall treatment plan. Details of the procedure, potential risks, and potential benefits were discussed. Patient's questions were answered. Patient elected to proceed with procedure. Medication: 1 mL Kenalog 40 mg/ML, 1 mL 0.5% bupivacaine, 1 mL 1% lidocaine  Procedure:  Sterile technique was used as the skin over the injection site was prepped with alcohol. The left subacromial bursa was then injected with the above listed medication. A sterile bandage was placed over the injection site. The patient tolerated the procedure well without complication. right Subacromial Bursa Aspiration / Injection Procedure:  Multiple treatment options were discussed. This injection was recommended as a part of the overall treatment plan. Details of the procedure, potential risks, and potential benefits were discussed. Patient's questions were answered. Patient elected to proceed with procedure. Medication: 1 mL Kenalog 40 mg/ML, 1 mL 0.5% bupivacaine, 1 mL 1% lidocaine  Procedure:  Sterile technique was used as the skin over the injection site was prepped with alcohol. The right subacromial bursa was then injected with the above listed medication.   A

## 2023-07-06 NOTE — PATIENT INSTRUCTIONS
PLEASE monitor you blood sugars  Continue to weight bear as tolerated  Continue range of motion  Ice and elevate as needed  Tylenol or Motrin for pain  Injection given into the bilateral shoulder  No high impact activities for a least 24-48 hours  Call office if there is persistent pains in the right shoulder please contact off and we will consider an MRI  Follow up as needed    We are committed to providing you the best care possible. If you receive a survey after visiting one of our offices, please take time to share your experience concerning your physician office visit. These surveys are confidential and no health information about you is shared. We are eager to improve for you and we are counting on your feedback to help make that happen.

## 2023-09-05 ENCOUNTER — HOSPITAL ENCOUNTER (OUTPATIENT)
Age: 48
Discharge: HOME | End: 2023-09-05
Payer: COMMERCIAL

## 2023-09-05 LAB
ESTIMATED AVERAGE GLUCOSE: 151 MG/DL
HBA1C MFR BLD: 6.9 % (ref 4.2–6.3)

## 2023-09-05 PROCEDURE — 36415 COLL VENOUS BLD VENIPUNCTURE: CPT

## 2023-09-05 PROCEDURE — 84403 ASSAY OF TOTAL TESTOSTERONE: CPT

## 2023-09-05 PROCEDURE — 83036 HEMOGLOBIN GLYCOSYLATED A1C: CPT

## 2023-09-06 LAB — TESTOST SERPL-MCNC: 146 NG/DL (ref 300–890)

## 2023-10-09 ENCOUNTER — OFFICE VISIT (OUTPATIENT)
Dept: ORTHOPEDIC SURGERY | Age: 48
End: 2023-10-09
Payer: COMMERCIAL

## 2023-10-09 VITALS
HEIGHT: 73 IN | BODY MASS INDEX: 37.37 KG/M2 | OXYGEN SATURATION: 98 % | WEIGHT: 282 LBS | RESPIRATION RATE: 16 BRPM | SYSTOLIC BLOOD PRESSURE: 112 MMHG | DIASTOLIC BLOOD PRESSURE: 78 MMHG | HEART RATE: 73 BPM

## 2023-10-09 DIAGNOSIS — M75.41 SUBACROMIAL IMPINGEMENT OF RIGHT SHOULDER: Primary | ICD-10-CM

## 2023-10-09 DIAGNOSIS — M75.42 SUBACROMIAL IMPINGEMENT OF LEFT SHOULDER: ICD-10-CM

## 2023-10-09 PROCEDURE — G8484 FLU IMMUNIZE NO ADMIN: HCPCS | Performed by: PHYSICIAN ASSISTANT

## 2023-10-09 PROCEDURE — 1036F TOBACCO NON-USER: CPT | Performed by: PHYSICIAN ASSISTANT

## 2023-10-09 PROCEDURE — G8417 CALC BMI ABV UP PARAM F/U: HCPCS | Performed by: PHYSICIAN ASSISTANT

## 2023-10-09 PROCEDURE — 20610 DRAIN/INJ JOINT/BURSA W/O US: CPT | Performed by: PHYSICIAN ASSISTANT

## 2023-10-09 PROCEDURE — 99213 OFFICE O/P EST LOW 20 MIN: CPT | Performed by: PHYSICIAN ASSISTANT

## 2023-10-09 PROCEDURE — G8427 DOCREV CUR MEDS BY ELIG CLIN: HCPCS | Performed by: PHYSICIAN ASSISTANT

## 2023-10-09 RX ORDER — TRIAMCINOLONE ACETONIDE 40 MG/ML
40 INJECTION, SUSPENSION INTRA-ARTICULAR; INTRAMUSCULAR ONCE
Status: COMPLETED | OUTPATIENT
Start: 2023-10-09 | End: 2023-10-09

## 2023-10-09 RX ADMIN — TRIAMCINOLONE ACETONIDE 40 MG: 40 INJECTION, SUSPENSION INTRA-ARTICULAR; INTRAMUSCULAR at 09:56

## 2023-10-09 RX ADMIN — TRIAMCINOLONE ACETONIDE 40 MG: 40 INJECTION, SUSPENSION INTRA-ARTICULAR; INTRAMUSCULAR at 09:57

## 2023-10-09 ASSESSMENT — ENCOUNTER SYMPTOMS
GASTROINTESTINAL NEGATIVE: 1
RESPIRATORY NEGATIVE: 1
EYES NEGATIVE: 1

## 2023-10-09 NOTE — PATIENT INSTRUCTIONS
MRI for the left shoulder ordered  Continue to weight bear as tolerated  Continue range of motion  Ice and elevate as needed  Tylenol or Motrin for pain  Injection given into the in 3 months  No high impact activities for a least 24-48 hours  Follow up in 3 months. IF feeling better call and cancel    We are committed to providing you the best care possible. If you receive a survey after visiting one of our offices, please take time to share your experience concerning your physician office visit. These surveys are confidential and no health information about you is shared. We are eager to improve for you and we are counting on your feedback to help make that happen.

## 2023-10-09 NOTE — PROGRESS NOTES
(KENALOG-40) injection 40 mg    MRI SHOULDER LEFT WO CONTRAST            Plan:   Left [facility] [place]. Patient Instructions   MRI for the left shoulder ordered  Continue to weight bear as tolerated  Continue range of motion  Ice and elevate as needed  Tylenol or Motrin for pain  Injection given into the in 3 months  No high impact activities for a least 24-48 hours  Follow up in 3 months. IF feeling better call and cancel    We are committed to providing you the best care possible. If you receive a survey after visiting one of our offices, please take time to share your experience concerning your physician office visit. These surveys are confidential and no health information about you is shared. We are eager to improve for you and we are counting on your feedback to help make that happen. left Subacromial Bursa Aspiration / Injection Procedure:  Multiple treatment options were discussed. This injection was recommended as a part of the overall treatment plan. Details of the procedure, potential risks, and potential benefits were discussed. Patient's questions were answered. Patient elected to proceed with procedure. Medication: 1 mL Kenalog 40 mg/ML, 1 mL 0.5% bupivacaine, 1 mL 1% lidocaine  Procedure:  Sterile technique was used as the skin over the injection site was prepped with alcohol. The left subacromial bursa was then injected with the above listed medication. A sterile bandage was placed over the injection site. The patient tolerated the procedure well without complication. right Subacromial Bursa Aspiration / Injection Procedure:  Multiple treatment options were discussed. This injection was recommended as a part of the overall treatment plan. Details of the procedure, potential risks, and potential benefits were discussed. Patient's questions were answered. Patient elected to proceed with procedure.   Medication: 1 mL Kenalog 40 mg/ML, 1 mL 0.5%

## 2024-01-08 ENCOUNTER — OFFICE VISIT (OUTPATIENT)
Dept: ORTHOPEDIC SURGERY | Age: 49
End: 2024-01-08
Payer: COMMERCIAL

## 2024-01-08 VITALS
RESPIRATION RATE: 16 BRPM | HEIGHT: 73 IN | WEIGHT: 281 LBS | HEART RATE: 89 BPM | BODY MASS INDEX: 37.24 KG/M2 | OXYGEN SATURATION: 98 %

## 2024-01-08 DIAGNOSIS — M75.41 SUBACROMIAL IMPINGEMENT OF RIGHT SHOULDER: Primary | ICD-10-CM

## 2024-01-08 DIAGNOSIS — M75.42 SUBACROMIAL IMPINGEMENT OF LEFT SHOULDER: ICD-10-CM

## 2024-01-08 PROCEDURE — 99999 PR OFFICE/OUTPT VISIT,PROCEDURE ONLY: CPT | Performed by: PHYSICIAN ASSISTANT

## 2024-01-08 PROCEDURE — 20610 DRAIN/INJ JOINT/BURSA W/O US: CPT | Performed by: PHYSICIAN ASSISTANT

## 2024-01-08 RX ORDER — TRIAMCINOLONE ACETONIDE 40 MG/ML
40 INJECTION, SUSPENSION INTRA-ARTICULAR; INTRAMUSCULAR ONCE
Status: COMPLETED | OUTPATIENT
Start: 2024-01-08 | End: 2024-01-08

## 2024-01-08 RX ADMIN — TRIAMCINOLONE ACETONIDE 40 MG: 40 INJECTION, SUSPENSION INTRA-ARTICULAR; INTRAMUSCULAR at 10:10

## 2024-01-08 RX ADMIN — TRIAMCINOLONE ACETONIDE 40 MG: 40 INJECTION, SUSPENSION INTRA-ARTICULAR; INTRAMUSCULAR at 10:09

## 2024-01-08 ASSESSMENT — ENCOUNTER SYMPTOMS
RESPIRATORY NEGATIVE: 1
GASTROINTESTINAL NEGATIVE: 1
EYES NEGATIVE: 1

## 2024-01-08 NOTE — PROGRESS NOTES
[facility]      Previous HPI:  Jean Mcclelland is a 48 y.o. male that presents to the office today complaining of bilateral shoulder pain.  He did get a steroid injection in his right shoulder back in March of this year and that helped up until recently.  He has not had an injection in the left shoulder but he states that the left shoulder does cause him significant discomfort now.  No injuries recently or within the last few years.  He does relate an injury to the right shoulder back when he was younger and playing baseball but that has been years ago.  No surgeries on either shoulder.  Pain is worse when he tries to raise his arms above his head or lift anything away from his body.  He rates his pain at an 8/10.    Previous HPI: Jean Mcclelland is a 48-year-old male returning to the office today with recurrent bilateral shoulder pain with the left shoulder hurting worse than the right.  He states that he is getting ready to go on a fishing trip and he will be doing a lot of casting and therefore would like to have his shoulders injected again.      Current HPI: Jean Mclcelland 48 y.o. male that returns with recurrent bilateral shoulder pain.      Past Medical History:   Diagnosis Date    Ankylosing spondylitis (HCC)     Gout     High blood pressure     High cholesterol     Migraine     Thyroid disease     Ulcer        No past surgical history on file.    Family History   Problem Relation Age of Onset    Diabetes Father        Social History     Socioeconomic History    Marital status:      Spouse name: None    Number of children: None    Years of education: None    Highest education level: None   Tobacco Use    Smoking status: Never    Smokeless tobacco: Never   Vaping Use    Vaping Use: Never used   Substance and Sexual Activity    Alcohol use: No    Drug use: No    Sexual activity: Never       Current Outpatient Medications   Medication Sig Dispense Refill    vitamin D3

## 2024-04-11 ENCOUNTER — OFFICE VISIT (OUTPATIENT)
Dept: ORTHOPEDIC SURGERY | Age: 49
End: 2024-04-11
Payer: COMMERCIAL

## 2024-04-11 VITALS
RESPIRATION RATE: 14 BRPM | OXYGEN SATURATION: 95 % | HEART RATE: 107 BPM | HEIGHT: 73 IN | BODY MASS INDEX: 37.24 KG/M2 | WEIGHT: 281 LBS

## 2024-04-11 DIAGNOSIS — M75.41 SUBACROMIAL IMPINGEMENT OF RIGHT SHOULDER: Primary | ICD-10-CM

## 2024-04-11 DIAGNOSIS — M75.42 SUBACROMIAL IMPINGEMENT OF LEFT SHOULDER: ICD-10-CM

## 2024-04-11 PROCEDURE — 20610 DRAIN/INJ JOINT/BURSA W/O US: CPT | Performed by: PHYSICIAN ASSISTANT

## 2024-04-11 RX ORDER — TRIAMCINOLONE ACETONIDE 40 MG/ML
40 INJECTION, SUSPENSION INTRA-ARTICULAR; INTRAMUSCULAR ONCE
Status: COMPLETED | OUTPATIENT
Start: 2024-04-11 | End: 2024-04-11

## 2024-04-11 RX ADMIN — TRIAMCINOLONE ACETONIDE 40 MG: 40 INJECTION, SUSPENSION INTRA-ARTICULAR; INTRAMUSCULAR at 15:59

## 2024-04-11 RX ADMIN — TRIAMCINOLONE ACETONIDE 40 MG: 40 INJECTION, SUSPENSION INTRA-ARTICULAR; INTRAMUSCULAR at 15:58

## 2024-04-11 NOTE — PATIENT INSTRUCTIONS
Continue weight-bearing as tolerated.  Continue range of motion exercises as instructed.  Ice and elevate as needed.  Tylenol or Motrin for pain.  Injection given into the bilateral shoulder.  Follow up in 3 months.    We are committed to providing you the best care possible.  If you receive a survey after visiting one of our offices, please take time to share your experience concerning your physician office visit.  These surveys are confidential and no health information about you is shared.  We are eager to improve for you and we are counting on your feedback to help make that happen.

## 2024-04-12 ASSESSMENT — ENCOUNTER SYMPTOMS
EYES NEGATIVE: 1
RESPIRATORY NEGATIVE: 1
GASTROINTESTINAL NEGATIVE: 1

## 2024-04-12 NOTE — PROGRESS NOTES
[facility]      Previous HPI:  Jean Mcclelland is a 49 y.o. male that presents to the office today complaining of bilateral shoulder pain.  He did get a steroid injection in his right shoulder back in March of this year and that helped up until recently.  He has not had an injection in the left shoulder but he states that the left shoulder does cause him significant discomfort now.  No injuries recently or within the last few years.  He does relate an injury to the right shoulder back when he was younger and playing baseball but that has been years ago.  No surgeries on either shoulder.  Pain is worse when he tries to raise his arms above his head or lift anything away from his body.  He rates his pain at an 8/10.    Previous HPI: Jean Mcclelland is a 48-year-old male returning to the office today with recurrent bilateral shoulder pain with the left shoulder hurting worse than the right.  He states that he is getting ready to go on a fishing trip and he will be doing a lot of casting and therefore would like to have his shoulders injected again.      Current HPI: Jean Mcclelland 49 y.o. male that returns with recurrent bilateral shoulder pain.      Past Medical History:   Diagnosis Date    Ankylosing spondylitis (HCC)     Gout     High blood pressure     High cholesterol     Migraine     Thyroid disease     Ulcer        No past surgical history on file.    Family History   Problem Relation Age of Onset    Diabetes Father        Social History     Socioeconomic History    Marital status:    Tobacco Use    Smoking status: Never    Smokeless tobacco: Never   Vaping Use    Vaping Use: Never used   Substance and Sexual Activity    Alcohol use: No    Drug use: No    Sexual activity: Never       Current Outpatient Medications   Medication Sig Dispense Refill    vitamin D3 (CHOLECALCIFEROL) 25 MCG (1000 UT) TABS tablet Take 1 tablet by mouth daily 90 tablet 1    Omega-3 Fatty Acids (FISH

## 2024-05-31 ENCOUNTER — HOSPITAL ENCOUNTER (OUTPATIENT)
Age: 49
Discharge: HOME | End: 2024-05-31
Payer: COMMERCIAL

## 2024-05-31 LAB
BASOPHILS ABSOLUTE: 0 K/CU MM
BASOPHILS RELATIVE PERCENT: 0.5 % (ref 0–1)
CHOLESTEROL, FASTING: 219 MG/DL
DIFFERENTIAL TYPE: ABNORMAL
EOSINOPHILS ABSOLUTE: 0.1 K/CU MM
EOSINOPHILS RELATIVE PERCENT: 0.7 % (ref 0–3)
ESTIMATED AVERAGE GLUCOSE: 134 MG/DL
HBA1C MFR BLD: 6.3 % (ref 4.2–6.3)
HCT VFR BLD CALC: 51.8 % (ref 42–52)
HDLC SERPL-MCNC: 36 MG/DL
HEMOGLOBIN: 17 GM/DL (ref 13.5–18)
IMMATURE NEUTROPHIL %: 0.5 % (ref 0–0.43)
LDLC SERPL CALC-MCNC: 166 MG/DL
LYMPHOCYTES ABSOLUTE: 1.8 K/CU MM
LYMPHOCYTES RELATIVE PERCENT: 20.7 % (ref 24–44)
MCH RBC QN AUTO: 31.8 PG (ref 27–31)
MCHC RBC AUTO-ENTMCNC: 32.8 % (ref 32–36)
MCV RBC AUTO: 97 FL (ref 78–100)
MONOCYTES ABSOLUTE: 0.8 K/CU MM
MONOCYTES RELATIVE PERCENT: 9.4 % (ref 0–4)
NEUTROPHILS ABSOLUTE: 5.8 K/CU MM
NEUTROPHILS RELATIVE PERCENT: 68.2 % (ref 36–66)
NUCLEATED RBC %: 0 %
PDW BLD-RTO: 13.2 % (ref 11.7–14.9)
PLATELET # BLD: 269 K/CU MM (ref 140–440)
PMV BLD AUTO: 9 FL (ref 7.5–11.1)
RBC # BLD: 5.34 M/CU MM (ref 4.6–6.2)
TOTAL IMMATURE NEUTOROPHIL: 0.04 K/CU MM
TOTAL NUCLEATED RBC: 0 K/CU MM
TRIGLYCERIDE, FASTING: 85 MG/DL
WBC # BLD: 8.5 K/CU MM (ref 4–10.5)

## 2024-05-31 PROCEDURE — 85025 COMPLETE CBC W/AUTO DIFF WBC: CPT

## 2024-05-31 PROCEDURE — 83036 HEMOGLOBIN GLYCOSYLATED A1C: CPT

## 2024-05-31 PROCEDURE — 80061 LIPID PANEL: CPT

## 2024-05-31 PROCEDURE — 36415 COLL VENOUS BLD VENIPUNCTURE: CPT

## 2024-05-31 PROCEDURE — 84403 ASSAY OF TOTAL TESTOSTERONE: CPT

## 2024-06-01 LAB — TESTOST SERPL-MCNC: 1273 NG/DL (ref 300–890)

## 2024-10-26 NOTE — PROGRESS NOTES
Pt to CT via wheelchair.    Results of MRI cervical spine and XR thoracolumbar spine - DOS 1/31/23 have been mailed to patient along with AVS from telehealth visit 2/2/23.

## 2025-08-21 ENCOUNTER — HOSPITAL ENCOUNTER (OUTPATIENT)
Dept: LAB | Age: 50
Discharge: HOME | End: 2025-08-21

## 2025-08-21 LAB
CHOLEST SERPL-MCNC: 228 MG/DL (ref 125–199)
EST. AVERAGE GLUCOSE BLD GHB EST-MCNC: 129 MG/DL
HBA1C MFR BLD: 6.1 % (ref 4.2–6.3)
HDLC SERPL-MCNC: 47 MG/DL
LDLC SERPL CALC-MCNC: 161 MG/DL
T4 FREE SERPL-MCNC: 0.9 NG/DL (ref 0.9–1.8)
TRIGL SERPL-MCNC: 101 MG/DL

## 2025-08-21 PROCEDURE — 80061 LIPID PANEL: CPT

## 2025-08-21 PROCEDURE — 83036 HEMOGLOBIN GLYCOSYLATED A1C: CPT

## 2025-08-21 PROCEDURE — 84439 ASSAY OF FREE THYROXINE: CPT
